# Patient Record
Sex: FEMALE | Race: WHITE | NOT HISPANIC OR LATINO | Employment: OTHER | URBAN - METROPOLITAN AREA
[De-identification: names, ages, dates, MRNs, and addresses within clinical notes are randomized per-mention and may not be internally consistent; named-entity substitution may affect disease eponyms.]

---

## 2023-03-17 ENCOUNTER — TELEPHONE (OUTPATIENT)
Dept: PRIMARY CARE | Facility: CLINIC | Age: 83
End: 2023-03-17

## 2023-03-17 PROCEDURE — G0179 MD RECERTIFICATION HHA PT: HCPCS | Performed by: INTERNAL MEDICINE

## 2023-03-24 DIAGNOSIS — R51.9 NONINTRACTABLE HEADACHE, UNSPECIFIED CHRONICITY PATTERN, UNSPECIFIED HEADACHE TYPE: Primary | ICD-10-CM

## 2023-03-24 RX ORDER — DIVALPROEX SODIUM 250 MG/1
250 TABLET, DELAYED RELEASE ORAL 2 TIMES DAILY
Qty: 60 TABLET | Refills: 0 | Status: SHIPPED | OUTPATIENT
Start: 2023-03-24 | End: 2023-10-30

## 2023-03-24 RX ORDER — DIVALPROEX SODIUM 250 MG/1
250 TABLET, DELAYED RELEASE ORAL 2 TIMES DAILY
COMMUNITY
End: 2023-03-24 | Stop reason: SDUPTHER

## 2023-04-10 ENCOUNTER — OFFICE VISIT (OUTPATIENT)
Dept: PRIMARY CARE | Facility: CLINIC | Age: 83
End: 2023-04-10
Payer: MEDICARE

## 2023-04-10 VITALS
BODY MASS INDEX: 35.25 KG/M2 | WEIGHT: 199 LBS | HEART RATE: 62 BPM | SYSTOLIC BLOOD PRESSURE: 128 MMHG | DIASTOLIC BLOOD PRESSURE: 64 MMHG | TEMPERATURE: 97.3 F | RESPIRATION RATE: 16 BRPM

## 2023-04-10 DIAGNOSIS — E03.9 HYPOTHYROIDISM, UNSPECIFIED TYPE: ICD-10-CM

## 2023-04-10 DIAGNOSIS — G47.00 INSOMNIA, UNSPECIFIED TYPE: ICD-10-CM

## 2023-04-10 DIAGNOSIS — E55.9 VITAMIN D DEFICIENCY: ICD-10-CM

## 2023-04-10 DIAGNOSIS — E66.09 CLASS 2 OBESITY DUE TO EXCESS CALORIES WITHOUT SERIOUS COMORBIDITY WITH BODY MASS INDEX (BMI) OF 35.0 TO 35.9 IN ADULT: ICD-10-CM

## 2023-04-10 DIAGNOSIS — Z00.00 ROUTINE GENERAL MEDICAL EXAMINATION AT HEALTH CARE FACILITY: Primary | ICD-10-CM

## 2023-04-10 DIAGNOSIS — R73.9 HYPERGLYCEMIA, UNSPECIFIED: ICD-10-CM

## 2023-04-10 DIAGNOSIS — R53.83 OTHER FATIGUE: ICD-10-CM

## 2023-04-10 PROCEDURE — 1157F ADVNC CARE PLAN IN RCRD: CPT | Performed by: INTERNAL MEDICINE

## 2023-04-10 PROCEDURE — 1170F FXNL STATUS ASSESSED: CPT | Performed by: INTERNAL MEDICINE

## 2023-04-10 PROCEDURE — 82306 VITAMIN D 25 HYDROXY: CPT | Performed by: INTERNAL MEDICINE

## 2023-04-10 PROCEDURE — G0439 PPPS, SUBSEQ VISIT: HCPCS | Performed by: INTERNAL MEDICINE

## 2023-04-10 PROCEDURE — 84443 ASSAY THYROID STIM HORMONE: CPT | Performed by: INTERNAL MEDICINE

## 2023-04-10 PROCEDURE — 85025 COMPLETE CBC W/AUTO DIFF WBC: CPT | Performed by: INTERNAL MEDICINE

## 2023-04-10 PROCEDURE — 83036 HEMOGLOBIN GLYCOSYLATED A1C: CPT | Performed by: INTERNAL MEDICINE

## 2023-04-10 PROCEDURE — 1036F TOBACCO NON-USER: CPT | Performed by: INTERNAL MEDICINE

## 2023-04-10 PROCEDURE — 1160F RVW MEDS BY RX/DR IN RCRD: CPT | Performed by: INTERNAL MEDICINE

## 2023-04-10 PROCEDURE — 1159F MED LIST DOCD IN RCRD: CPT | Performed by: INTERNAL MEDICINE

## 2023-04-10 PROCEDURE — 99213 OFFICE O/P EST LOW 20 MIN: CPT | Performed by: INTERNAL MEDICINE

## 2023-04-10 RX ORDER — ASPIRIN 81 MG/1
TABLET ORAL
COMMUNITY
End: 2023-05-15

## 2023-04-10 RX ORDER — OLMESARTAN MEDOXOMIL 40 MG/1
TABLET ORAL
COMMUNITY
End: 2023-06-30

## 2023-04-10 RX ORDER — AMMONIUM LACTATE 12 G/100G
CREAM TOPICAL
COMMUNITY
Start: 2022-09-01

## 2023-04-10 RX ORDER — SIMVASTATIN 40 MG/1
40 TABLET, FILM COATED ORAL NIGHTLY
COMMUNITY
End: 2023-11-09

## 2023-04-10 RX ORDER — MIRTAZAPINE 7.5 MG/1
7.5 TABLET, FILM COATED ORAL NIGHTLY
Qty: 30 TABLET | Refills: 2 | Status: SHIPPED | OUTPATIENT
Start: 2023-04-10 | End: 2023-07-03

## 2023-04-10 RX ORDER — SERTRALINE HYDROCHLORIDE 25 MG/1
TABLET, FILM COATED ORAL
COMMUNITY
Start: 2021-07-13 | End: 2023-10-24 | Stop reason: ALTCHOICE

## 2023-04-10 RX ORDER — POLYVINYL ALCOHOL, POVIDONE .6; .5 G/100ML; G/100ML
SOLUTION/ DROPS INTRAOCULAR
COMMUNITY
Start: 2023-03-23 | End: 2024-01-23 | Stop reason: WASHOUT

## 2023-04-10 RX ORDER — OMEPRAZOLE 40 MG/1
CAPSULE, DELAYED RELEASE ORAL
COMMUNITY
Start: 2016-07-20 | End: 2024-04-29 | Stop reason: WASHOUT

## 2023-04-10 RX ORDER — LEVOTHYROXINE SODIUM 50 UG/1
50 TABLET ORAL DAILY
Qty: 30 TABLET | Refills: 2 | Status: SHIPPED | OUTPATIENT
Start: 2023-04-10 | End: 2023-09-08

## 2023-04-10 RX ORDER — MUPIROCIN 20 MG/G
OINTMENT TOPICAL
COMMUNITY
Start: 2022-08-02 | End: 2024-04-29 | Stop reason: WASHOUT

## 2023-04-10 RX ORDER — DICLOFENAC SODIUM 10 MG/G
GEL TOPICAL
COMMUNITY
Start: 2022-11-17

## 2023-04-10 RX ORDER — ATENOLOL 50 MG/1
50 TABLET ORAL DAILY
COMMUNITY
End: 2023-06-19

## 2023-04-10 ASSESSMENT — ACTIVITIES OF DAILY LIVING (ADL)
STIL DRIVING: NO
DOING HOUSEWORK: NEEDS ASSISTANCE
JUDGMENT_ADEQUATE_SAFELY_COMPLETE_DAILY_ACTIVITIES: YES
ADEQUATE_TO_COMPLETE_ADL: YES
TAKING MEDICATION: INDEPENDENT
HEARING - LEFT EAR: FUNCTIONAL
GROCERY SHOPPING: NEEDS ASSISTANCE
FEEDING YOURSELF: INDEPENDENT
FEEDING: INDEPENDENT
ADEQUATE_TO_COMPLETE_ADL: YES
USING TELEPHONE: INDEPENDENT
WALKS IN HOME: INDEPENDENT
BATHING: NEEDS ASSISTANCE
MANAGING FINANCES: TOTAL CARE
USING TRANSPORTATION: TOTAL CARE
GROOMING: INDEPENDENT
DRESSING: INDEPENDENT
NEEDS ASSISTANCE WITH FOOD: SET UP OF PLATE
TOILETING: INDEPENDENT
PATIENT'S MEMORY ADEQUATE TO SAFELY COMPLETE DAILY ACTIVITIES?: YES
JUDGMENT_ADEQUATE_SAFELY_COMPLETE_DAILY_ACTIVITIES: YES
HEARING - RIGHT EAR: HEARING AID
PREPARING MEALS: NEEDS ASSISTANCE
PILL BOX USED: YES
TOILETING: INDEPENDENT
DRESSING YOURSELF: INDEPENDENT
BATHING: NEEDS ASSISTANCE
EATING: INDEPENDENT

## 2023-04-10 ASSESSMENT — ANXIETY QUESTIONNAIRES
4. TROUBLE RELAXING: SEVERAL DAYS
6. BECOMING EASILY ANNOYED OR IRRITABLE: SEVERAL DAYS
5. BEING SO RESTLESS THAT IT IS HARD TO SIT STILL: SEVERAL DAYS
7. FEELING AFRAID AS IF SOMETHING AWFUL MIGHT HAPPEN: SEVERAL DAYS
IF YOU CHECKED OFF ANY PROBLEMS ON THIS QUESTIONNAIRE, HOW DIFFICULT HAVE THESE PROBLEMS MADE IT FOR YOU TO DO YOUR WORK, TAKE CARE OF THINGS AT HOME, OR GET ALONG WITH OTHER PEOPLE: SOMEWHAT DIFFICULT
1. FEELING NERVOUS, ANXIOUS, OR ON EDGE: SEVERAL DAYS
3. WORRYING TOO MUCH ABOUT DIFFERENT THINGS: SEVERAL DAYS
GAD7 TOTAL SCORE: 7
2. NOT BEING ABLE TO STOP OR CONTROL WORRYING: SEVERAL DAYS

## 2023-04-10 ASSESSMENT — ENCOUNTER SYMPTOMS
SLEEP DISTURBANCE: 1
WEAKNESS: 1
OCCASIONAL FEELINGS OF UNSTEADINESS: 1
HEADACHES: 1
PALPITATIONS: 0
CONSTIPATION: 0
ABDOMINAL DISTENTION: 0
ARTHRALGIAS: 1
LIGHT-HEADEDNESS: 1
LOSS OF SENSATION IN FEET: 0
NERVOUS/ANXIOUS: 1
BACK PAIN: 1
RHINORRHEA: 0
DIARRHEA: 0
FREQUENCY: 1
COUGH: 0
FATIGUE: 1
SHORTNESS OF BREATH: 0
NAUSEA: 0
DEPRESSION: 1
ACTIVITY CHANGE: 1

## 2023-04-10 ASSESSMENT — COLUMBIA-SUICIDE SEVERITY RATING SCALE - C-SSRS
6. HAVE YOU EVER DONE ANYTHING, STARTED TO DO ANYTHING, OR PREPARED TO DO ANYTHING TO END YOUR LIFE?: NO
1. IN THE PAST MONTH, HAVE YOU WISHED YOU WERE DEAD OR WISHED YOU COULD GO TO SLEEP AND NOT WAKE UP?: NO
2. HAVE YOU ACTUALLY HAD ANY THOUGHTS OF KILLING YOURSELF?: NO

## 2023-04-10 ASSESSMENT — PATIENT HEALTH QUESTIONNAIRE - PHQ9
1. LITTLE INTEREST OR PLEASURE IN DOING THINGS: SEVERAL DAYS
10. IF YOU CHECKED OFF ANY PROBLEMS, HOW DIFFICULT HAVE THESE PROBLEMS MADE IT FOR YOU TO DO YOUR WORK, TAKE CARE OF THINGS AT HOME, OR GET ALONG WITH OTHER PEOPLE: SOMEWHAT DIFFICULT
2. FEELING DOWN, DEPRESSED OR HOPELESS: SEVERAL DAYS
SUM OF ALL RESPONSES TO PHQ9 QUESTIONS 1 AND 2: 2

## 2023-04-10 ASSESSMENT — PAIN SCALES - GENERAL: PAINLEVEL: 4

## 2023-04-10 NOTE — PROGRESS NOTES
Subjective        Complaining of low energy.  Weak.  Has insomnia.    HPI      This is an 82 years old [place] speaking lady with medical history significant for hypertension, hypothyroidism, depression, anxiety, arthralgia, low back pain, diabetes, diet controlled, s/p forehead left-sided squamosal carcinoma removal.  Patient is presented for evaluation and treatment of the above complaints.  Weak at time.  Grieving the loss of her .       Blood pressure is well controlled.  Has been having arthralgia, low back pain.  Has been having fatigue and malaise.        Taking medications as directed.  Blood pressure is very well controlled.  Has been having bilateral knee pain.   Has insomnia.   Weak.  Has small joint pain.  Has been using nutritional supplements.  Has urinary incontinence.    Review of Systems    Review of Systems   Constitutional:  Positive for activity change and fatigue.   HENT:  Negative for postnasal drip and rhinorrhea.    Eyes:  Positive for visual disturbance.   Respiratory:  Negative for cough and shortness of breath.    Cardiovascular:  Negative for chest pain, palpitations and leg swelling.   Gastrointestinal:  Negative for abdominal distention, constipation, diarrhea and nausea.   Genitourinary:  Positive for frequency.   Musculoskeletal:  Positive for arthralgias and back pain.   Neurological:  Positive for weakness, light-headedness and headaches.   Psychiatric/Behavioral:  Positive for sleep disturbance. The patient is nervous/anxious.        Objective        Vitals:    04/10/23 1423   BP: 128/64   Pulse: 62   Resp: 16   Temp: 36.3 °C (97.3 °F)        Physical Exam  Constitutional:       Appearance: Normal appearance.   HENT:      Head: Normocephalic.      Nose: Nose normal.      Mouth/Throat:      Mouth: Mucous membranes are moist.   Cardiovascular:      Rate and Rhythm: Normal rate and regular rhythm.   Pulmonary:      Effort: Pulmonary effort is normal.      Breath sounds: Normal  breath sounds.   Abdominal:      Palpations: Abdomen is soft.   Musculoskeletal:         General: Normal range of motion.      Cervical back: Normal range of motion.   Skin:     General: Skin is warm.   Neurological:      General: No focal deficit present.      Mental Status: She is alert and oriented to person, place, and time.   Psychiatric:         Mood and Affect: Mood normal.       Diagnoses and all orders for this visit:  Routine general medical examination at health care facility (Primary)  -     1 Year Follow Up In Primary Care - Wellness Exam; Future  Hypothyroidism, unspecified type  -     Synthroid 50 mcg tablet; Take 1 tablet (50 mcg) by mouth once daily.  -     Thyroid Stimulating Hormone  Other fatigue  -     CBC  Hyperglycemia, unspecified  -     Hemoglobin A1C  Vitamin D deficiency  -     Vitamin D 1,25 Dihydroxy  Insomnia, unspecified type  -     mirtazapine (Remeron) 7.5 mg tablet; Take 1 tablet (7.5 mg) by mouth once daily at bedtime.  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  Follow-up with dermatology.  Scheduled for stitches removal in AM.    - Hypertension.  Well-controlled.  Current therapy with amlodipine 5 mg 2 tablets daily, HCTZ 12.5 mg daily, olmesartan 40 mg daily and atenolol.  Diet, exercise.  Avoid salt.    - Gastroesophageal reflux disease.  Eat small portions.  Avoid Caffeine, spicy foods, chocolate, alcohol.  Do not wear tight clothes.  Keep last meal before bed time > 3 hours.  Keep head side of the bed elevated at all time    - Bilateral knee pain.  See orthopedic surgery.  You may benefit to have gel injections.    - Low back pain.  Spinal stenosis.  Bilateral leg pain.  Tylenol 500 mg every 6 hours as needed.  Start on physical therapy.    - Depression, anxiety.  Followed by Dr Cortez.  Taking Mirtazapine daily.     -  Dermatitis.  Improved, continue to use ketoconazole shampoo, Temovate Solution.  Use moisturizing cream, avoid soap.    - Constipations.  Continue with Lactulose.  Fiber.  Plenty of fluids.    - Varicosities.  No evidence of DVT.  Wear LINA hose.    - Diabetes Type 2.  Diet controlled, exercise.    - Pancreatic cyst.  Follow up with GI to have endoscopic ultrasound.  Declined for now.    - Fibromyalgia.  Stable now, avoid stress, exercise.    - Hypothyroidism.  Current therapy.    - Hyperlipidemia.  Exercise, diet, take medications as directed.  Well controlled with Simvastatin.    - Class 1 Obesity with BMI 35.25.  Weight loss, exercise, diet.  Ideal BMI is less, then 25.    - Health maintenance.  Influenza, Pneumovax is up to date.  Medicare wellness annual exam is  today.        Sarina Mclain MD

## 2023-05-16 PROCEDURE — G0179 MD RECERTIFICATION HHA PT: HCPCS | Performed by: INTERNAL MEDICINE

## 2023-07-03 DIAGNOSIS — G47.00 INSOMNIA, UNSPECIFIED TYPE: ICD-10-CM

## 2023-07-03 RX ORDER — MIRTAZAPINE 7.5 MG/1
7.5 TABLET, FILM COATED ORAL NIGHTLY
Qty: 30 TABLET | Refills: 0 | Status: SHIPPED | OUTPATIENT
Start: 2023-07-03 | End: 2023-08-02

## 2023-07-05 DIAGNOSIS — I10 PRIMARY HYPERTENSION: ICD-10-CM

## 2023-07-06 ENCOUNTER — APPOINTMENT (OUTPATIENT)
Dept: PRIMARY CARE | Facility: CLINIC | Age: 83
End: 2023-07-06
Payer: MEDICARE

## 2023-07-06 RX ORDER — OLMESARTAN MEDOXOMIL 40 MG/1
TABLET ORAL
Qty: 30 TABLET | Refills: 0 | Status: SHIPPED | OUTPATIENT
Start: 2023-07-06 | End: 2023-07-25

## 2023-07-18 ENCOUNTER — OFFICE VISIT (OUTPATIENT)
Dept: PRIMARY CARE | Facility: CLINIC | Age: 83
End: 2023-07-18
Payer: MEDICARE

## 2023-07-18 VITALS
SYSTOLIC BLOOD PRESSURE: 128 MMHG | HEIGHT: 63 IN | HEART RATE: 62 BPM | RESPIRATION RATE: 16 BRPM | BODY MASS INDEX: 34.2 KG/M2 | WEIGHT: 193 LBS | DIASTOLIC BLOOD PRESSURE: 78 MMHG | TEMPERATURE: 97.9 F

## 2023-07-18 DIAGNOSIS — R39.15 URGENCY OF URINATION: ICD-10-CM

## 2023-07-18 DIAGNOSIS — E03.9 HYPOTHYROIDISM, UNSPECIFIED TYPE: ICD-10-CM

## 2023-07-18 DIAGNOSIS — R53.83 OTHER FATIGUE: ICD-10-CM

## 2023-07-18 DIAGNOSIS — E78.5 HYPERLIPIDEMIA, UNSPECIFIED HYPERLIPIDEMIA TYPE: ICD-10-CM

## 2023-07-18 DIAGNOSIS — R73.9 HYPERGLYCEMIA, UNSPECIFIED: ICD-10-CM

## 2023-07-18 DIAGNOSIS — M25.561 PAIN IN BOTH KNEES, UNSPECIFIED CHRONICITY: ICD-10-CM

## 2023-07-18 DIAGNOSIS — E55.9 VITAMIN D DEFICIENCY: Primary | ICD-10-CM

## 2023-07-18 DIAGNOSIS — M54.50 LOW BACK PAIN, UNSPECIFIED BACK PAIN LATERALITY, UNSPECIFIED CHRONICITY, UNSPECIFIED WHETHER SCIATICA PRESENT: ICD-10-CM

## 2023-07-18 DIAGNOSIS — M25.562 PAIN IN BOTH KNEES, UNSPECIFIED CHRONICITY: ICD-10-CM

## 2023-07-18 LAB
APPEARANCE UR: CLEAR
BILIRUB UR QL STRIP: NEGATIVE
COLOR UR: YELLOW
GLUCOSE UR STRIP-MCNC: NEGATIVE MG/DL
HGB UR QL STRIP: NEGATIVE
KETONES UR STRIP-MCNC: NEGATIVE MG/DL
LEUKOCYTE ESTERASE UR QL STRIP: NEGATIVE
NITRITE UR QL STRIP: NEGATIVE
PH UR STRIP: 7 [PH]
PROT UR STRIP-MCNC: NORMAL MG/DL
SP GR UR STRIP.AUTO: 1.02
UROBILINOGEN UR STRIP-ACNC: 0.2 E.U./DL

## 2023-07-18 PROCEDURE — 82044 UR ALBUMIN SEMIQUANTITATIVE: CPT | Performed by: INTERNAL MEDICINE

## 2023-07-18 PROCEDURE — 1160F RVW MEDS BY RX/DR IN RCRD: CPT | Performed by: INTERNAL MEDICINE

## 2023-07-18 PROCEDURE — 1159F MED LIST DOCD IN RCRD: CPT | Performed by: INTERNAL MEDICINE

## 2023-07-18 PROCEDURE — 83036 HEMOGLOBIN GLYCOSYLATED A1C: CPT | Performed by: INTERNAL MEDICINE

## 2023-07-18 PROCEDURE — 1157F ADVNC CARE PLAN IN RCRD: CPT | Performed by: INTERNAL MEDICINE

## 2023-07-18 PROCEDURE — 84443 ASSAY THYROID STIM HORMONE: CPT | Performed by: INTERNAL MEDICINE

## 2023-07-18 PROCEDURE — 99213 OFFICE O/P EST LOW 20 MIN: CPT | Performed by: INTERNAL MEDICINE

## 2023-07-18 PROCEDURE — 82306 VITAMIN D 25 HYDROXY: CPT | Performed by: INTERNAL MEDICINE

## 2023-07-18 PROCEDURE — 80061 LIPID PANEL: CPT | Performed by: INTERNAL MEDICINE

## 2023-07-18 PROCEDURE — 80053 COMPREHEN METABOLIC PANEL: CPT | Performed by: INTERNAL MEDICINE

## 2023-07-18 PROCEDURE — 1125F AMNT PAIN NOTED PAIN PRSNT: CPT | Performed by: INTERNAL MEDICINE

## 2023-07-18 PROCEDURE — 81003 URINALYSIS AUTO W/O SCOPE: CPT | Performed by: INTERNAL MEDICINE

## 2023-07-18 PROCEDURE — 1036F TOBACCO NON-USER: CPT | Performed by: INTERNAL MEDICINE

## 2023-07-18 ASSESSMENT — ENCOUNTER SYMPTOMS
UNEXPECTED WEIGHT CHANGE: 0
BACK PAIN: 1
APPETITE CHANGE: 0
LIGHT-HEADEDNESS: 1
ARTHRALGIAS: 1
ACTIVITY CHANGE: 0
SLEEP DISTURBANCE: 1
FATIGUE: 1
HEADACHES: 1
COLOR CHANGE: 1

## 2023-07-18 ASSESSMENT — PAIN SCALES - GENERAL: PAINLEVEL: 6

## 2023-07-18 NOTE — PROGRESS NOTES
Subjective    Massiel Ellison is a 82 y.o. female who presents for  Blood work.  Has arthralgia, knee pain.  Has malaise.  Difficulty to ambulate.    HPI  This is an 82 years old [place]ag[place] speaking lady with medical history significant for hypertension, hypothyroidism, depression, anxiety, arthralgia, low back pain, diabetes, diet controlled, s/p forehead left-sided squamosal carcinoma removal.  Patient is presented for evaluation and treatment of the above complaints.  Weak at time.  Grieving the loss of her .       Blood pressure is well controlled.  Has been having arthralgia, low back pain.  Has been having fatigue and malaise.        Taking medications as directed.  Blood pressure is very well controlled.  Has been having bilateral knee pain.   Has insomnia.   Weak.  Has small joint pain.  Has been using nutritional supplements.  Has urinary incontinence.  Depressed, has poor sleep.    Review of Systems   Constitutional:  Positive for fatigue. Negative for activity change, appetite change and unexpected weight change.   Musculoskeletal:  Positive for arthralgias and back pain.   Skin:  Positive for color change.   Neurological:  Positive for light-headedness and headaches.   Psychiatric/Behavioral:  Positive for sleep disturbance.        Objective        Vitals:    07/18/23 1111   BP: 128/78   Pulse: 62   Resp: 16   Temp: 36.6 °C (97.9 °F)        Physical Exam  HENT:      Head: Normocephalic.      Nose: Nose normal.      Mouth/Throat:      Mouth: Mucous membranes are moist.   Cardiovascular:      Rate and Rhythm: Normal rate.   Pulmonary:      Effort: Pulmonary effort is normal.   Abdominal:      Palpations: Abdomen is soft.   Musculoskeletal:         General: Normal range of motion.   Skin:     General: Skin is warm.   Neurological:      General: No focal deficit present.      Mental Status: She is alert.     Diabetic foot exam.  Good pulses, intact skin.  Mild edema.    Diagnoses and all orders for this  visit:  Vitamin D deficiency (Primary)  -     Vitamin D, Total  Hypothyroidism, unspecified type  Other fatigue  -     CBC  -     Thyroid Stimulating Hormone  Hyperglycemia, unspecified  -     Hemoglobin A1C  -     POCT ALBUMIN RANDOM URINE DOCKED DEVICE  Hyperlipidemia, unspecified hyperlipidemia type  -     Comprehensive Metabolic Panel  -     Lipid Panel  Low back pain, unspecified back pain laterality, unspecified chronicity, unspecified whether sciatica present  -     Referral to Physical Therapy; Future  Pain in both knees, unspecified chronicity  -     Referral to Physical Therapy; Future  Urgency of urination  -     POCT UA (Automated) docked device  Other orders  -     POCT URINALYSIS AUTOMATED      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  Follow-up with dermatology.  Scheduled for stitches removal in AM.     - Hypertension.  Well-controlled.  Current therapy with amlodipine 5 mg 2 tablets daily, HCTZ 12.5 mg daily, olmesartan 40 mg daily and atenolol.  Diet, exercise.  Avoid salt.     - Gastroesophageal reflux disease.  Eat small portions.  Avoid Caffeine, spicy foods, chocolate, alcohol.  Do not wear tight clothes.  Keep last meal before bed time > 3 hours.  Keep head side of the bed elevated at all time     - Bilateral knee pain.  See orthopedic surgery.  You may benefit to have gel injections.     - Low back pain.  Spinal stenosis.  Bilateral leg pain.  Tylenol 500 mg every 6 hours as needed.  Start on physical therapy.     - Depression, anxiety.  Followed by Dr Cortez.  Taking Mirtazapine daily.      - Dermatitis.  Improved, continue to use ketoconazole shampoo, Temovate Solution.  Use moisturizing cream, avoid soap.     - Constipations.  Continue with Lactulose.  Fiber.  Plenty of fluids.     - Varicosities.  No evidence of DVT.  Wear LINA hose.     - Diabetes Type 2.  Diet controlled, exercise.     - Pancreatic cyst.  Follow up with GI to have endoscopic ultrasound.  Declined for  now.     - Fibromyalgia.  Stable now, avoid stress, exercise.     - Hypothyroidism.  Current therapy.  TSH 1.72.     - Hyperlipidemia.  Exercise, diet, take medications as directed.  Well controlled with Simvastatin.  Today cholesterol 156, LDL is 72.     - Class 1 Obesity with BMI 34.19  Weight loss, exercise, diet.  Ideal BMI is less, then 25.     - Health maintenance.  Influenza, Pneumovax is up to date.  Medicare wellness annual exam is  up to date.      Sarina Mclain MD

## 2023-09-13 PROCEDURE — G0179 MD RECERTIFICATION HHA PT: HCPCS | Performed by: INTERNAL MEDICINE

## 2023-09-19 DIAGNOSIS — I10 PRIMARY HYPERTENSION: ICD-10-CM

## 2023-09-19 RX ORDER — OLMESARTAN MEDOXOMIL 40 MG/1
40 TABLET ORAL DAILY
Qty: 30 TABLET | Refills: 0 | Status: SHIPPED | OUTPATIENT
Start: 2023-09-19 | End: 2023-12-28

## 2023-09-25 ENCOUNTER — APPOINTMENT (OUTPATIENT)
Dept: PRIMARY CARE | Facility: CLINIC | Age: 83
End: 2023-09-25
Payer: MEDICARE

## 2023-09-27 ENCOUNTER — APPOINTMENT (OUTPATIENT)
Dept: PRIMARY CARE | Facility: CLINIC | Age: 83
End: 2023-09-27
Payer: MEDICARE

## 2023-09-28 ENCOUNTER — OFFICE VISIT (OUTPATIENT)
Dept: PRIMARY CARE | Facility: CLINIC | Age: 83
End: 2023-09-28
Payer: MEDICARE

## 2023-09-28 VITALS
SYSTOLIC BLOOD PRESSURE: 128 MMHG | RESPIRATION RATE: 16 BRPM | HEIGHT: 63 IN | TEMPERATURE: 97.3 F | BODY MASS INDEX: 34.73 KG/M2 | DIASTOLIC BLOOD PRESSURE: 78 MMHG | HEART RATE: 64 BPM | WEIGHT: 196 LBS

## 2023-09-28 DIAGNOSIS — M25.562 PAIN IN BOTH KNEES, UNSPECIFIED CHRONICITY: ICD-10-CM

## 2023-09-28 DIAGNOSIS — M25.561 PAIN IN BOTH KNEES, UNSPECIFIED CHRONICITY: ICD-10-CM

## 2023-09-28 DIAGNOSIS — M54.50 LOW BACK PAIN, UNSPECIFIED BACK PAIN LATERALITY, UNSPECIFIED CHRONICITY, UNSPECIFIED WHETHER SCIATICA PRESENT: Primary | ICD-10-CM

## 2023-09-28 PROCEDURE — 1159F MED LIST DOCD IN RCRD: CPT | Performed by: INTERNAL MEDICINE

## 2023-09-28 PROCEDURE — 99213 OFFICE O/P EST LOW 20 MIN: CPT | Performed by: INTERNAL MEDICINE

## 2023-09-28 PROCEDURE — 1036F TOBACCO NON-USER: CPT | Performed by: INTERNAL MEDICINE

## 2023-09-28 PROCEDURE — 1125F AMNT PAIN NOTED PAIN PRSNT: CPT | Performed by: INTERNAL MEDICINE

## 2023-09-28 PROCEDURE — 1160F RVW MEDS BY RX/DR IN RCRD: CPT | Performed by: INTERNAL MEDICINE

## 2023-09-28 RX ORDER — CLOBETASOL PROPIONATE 0.5 MG/G
CREAM TOPICAL
COMMUNITY
Start: 2023-06-26

## 2023-09-28 RX ORDER — ACETAMINOPHEN 500 MG
500 TABLET ORAL EVERY 6 HOURS PRN
Qty: 100 TABLET | Refills: 2 | Status: SHIPPED | OUTPATIENT
Start: 2023-09-28 | End: 2024-01-06

## 2023-09-28 RX ORDER — CICLOPIROX OLAMINE 7.7 MG/G
CREAM TOPICAL
COMMUNITY
Start: 2023-09-22

## 2023-09-28 ASSESSMENT — ENCOUNTER SYMPTOMS
ARTHRALGIAS: 1
SHORTNESS OF BREATH: 1
FREQUENCY: 1
ABDOMINAL DISTENTION: 0
LIGHT-HEADEDNESS: 1
BACK PAIN: 1
HEADACHES: 1
APPETITE CHANGE: 1
ACTIVITY CHANGE: 1

## 2023-09-28 ASSESSMENT — PAIN SCALES - GENERAL: PAINLEVEL: 7

## 2023-09-28 NOTE — PROGRESS NOTES
Subjective    Massiel Ellison is a 83 y.o. female who presents for  follow up.  Has bilateral knee pain.  Weak.  Depressed.    HPI  This is an 82 years old [place]ian speaking lady with medical history significant for hypertension, hypothyroidism, depression, anxiety, arthralgia, low back pain, diabetes, diet controlled, s/p forehead left-sided squamosal carcinoma removal.  Patient is presented for evaluation and treatment of the above complaints.  Has been having bilateral knee pain.  Has difficulty to ambulate.  Weak at time.  Grieving the loss of her .       Blood pressure is well controlled.  Has been having arthralgia, low back pain.  Has been having fatigue and malaise.        Taking medications as directed.  Blood pressure is very well controlled.  Has been having bilateral knee pain.   Has insomnia.   Weak.  Has small joint pain.  Has been using nutritional supplements.  Has urinary incontinence.  Depressed, has poor sleep.     Review of Systems   Constitutional:  Positive for activity change and appetite change.   Respiratory:  Positive for shortness of breath.    Gastrointestinal:  Negative for abdominal distention.   Genitourinary:  Positive for frequency.   Musculoskeletal:  Positive for arthralgias and back pain.   Neurological:  Positive for light-headedness and headaches.       Objective        Vitals:    09/28/23 1156   BP: 128/78   Pulse: 64   Resp: 16   Temp: 36.3 °C (97.3 °F)        Physical Exam  Constitutional:       Appearance: Normal appearance.   HENT:      Head: Normocephalic.      Mouth/Throat:      Mouth: Mucous membranes are moist.   Pulmonary:      Breath sounds: Normal breath sounds.   Abdominal:      Palpations: Abdomen is soft.   Musculoskeletal:         General: Normal range of motion.      Cervical back: Normal range of motion.   Skin:     General: Skin is warm.   Neurological:      General: No focal deficit present.      Mental Status: She is alert.       Diagnoses and all orders  for this visit:  Low back pain, unspecified back pain laterality, unspecified chronicity, unspecified whether sciatica present (Primary)  -     acetaminophen (Tylenol Extra Strength) 500 mg tablet; Take 1 tablet (500 mg) by mouth every 6 hours if needed for mild pain (1 - 3).  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   Follow up with dermatology.     - Hypertension.  Well-controlled.  Current therapy with amlodipine 5 mg 2 tablets daily, HCTZ 12.5 mg daily, olmesartan 40 mg daily and atenolol.  Diet, exercise.  Avoid salt.     - Gastroesophageal reflux disease.  Eat small portions.  Avoid Caffeine, spicy foods, chocolate, alcohol.  Do not wear tight clothes.  Keep last meal before bed time > 3 hours.  Keep head side of the bed elevated at all time     - Bilateral knee pain.  See orthopedic surgery.  You may benefit to have gel injections.  Repeat Chest X ray.     - Low back pain.  Spinal stenosis.  Bilateral leg pain.  Tylenol 500 mg every 6 hours as needed.  Start on physical therapy.     - Depression, anxiety.  Followed by Dr Cortez.  Taking Mirtazapine daily.      - Dermatitis.  Improved, continue to use ketoconazole shampoo, Temovate Solution.  Use moisturizing cream, avoid soap.     - Constipations.  Continue with Lactulose.  Fiber.  Plenty of fluids.     - Varicosities.  No evidence of DVT.  Wear LINA hose.     - Diabetes Type 2.  Diet controlled, exercise.     - Pancreatic cyst.  Follow up with GI to have endoscopic ultrasound.  Declined for now.     - Fibromyalgia.  Stable now, avoid stress, exercise.     - Hypothyroidism.  Current therapy.  TSH 1.72.     - Hyperlipidemia.  Exercise, diet, take  medications as directed.  Well controlled with Simvastatin.  Today cholesterol 156, LDL is 72.     - Class 1 Obesity with BMI 34.72  Weight loss, exercise, diet.  Ideal BMI is less, then 25.     - Health maintenance.  Influenza, Pneumovax is up to date.  Medicare wellness annual exam is  up to date.      Sarina Mclain MD

## 2023-10-05 DIAGNOSIS — E03.9 HYPOTHYROIDISM, UNSPECIFIED TYPE: ICD-10-CM

## 2023-10-05 DIAGNOSIS — E78.5 HYPERLIPIDEMIA, UNSPECIFIED HYPERLIPIDEMIA TYPE: ICD-10-CM

## 2023-10-06 RX ORDER — ASPIRIN 81 MG/1
TABLET ORAL
Qty: 30 TABLET | Refills: 6 | Status: SHIPPED | OUTPATIENT
Start: 2023-10-06

## 2023-10-06 RX ORDER — LEVOTHYROXINE SODIUM 50 UG/1
50 TABLET ORAL DAILY
Qty: 30 TABLET | Refills: 6 | Status: SHIPPED | OUTPATIENT
Start: 2023-10-06 | End: 2024-04-11

## 2023-10-17 DIAGNOSIS — I10 PRIMARY HYPERTENSION: ICD-10-CM

## 2023-10-17 RX ORDER — HYDROCHLOROTHIAZIDE 12.5 MG/1
TABLET ORAL
Qty: 30 TABLET | Refills: 3 | Status: SHIPPED | OUTPATIENT
Start: 2023-10-17 | End: 2024-02-12

## 2023-10-24 ENCOUNTER — OFFICE VISIT (OUTPATIENT)
Dept: PRIMARY CARE | Facility: CLINIC | Age: 83
End: 2023-10-24
Payer: MEDICARE

## 2023-10-24 VITALS
BODY MASS INDEX: 34.37 KG/M2 | DIASTOLIC BLOOD PRESSURE: 78 MMHG | WEIGHT: 194 LBS | TEMPERATURE: 97.3 F | HEART RATE: 62 BPM | SYSTOLIC BLOOD PRESSURE: 118 MMHG | RESPIRATION RATE: 16 BRPM

## 2023-10-24 DIAGNOSIS — E55.9 VITAMIN D DEFICIENCY: Primary | ICD-10-CM

## 2023-10-24 DIAGNOSIS — E03.9 HYPOTHYROIDISM, UNSPECIFIED TYPE: ICD-10-CM

## 2023-10-24 DIAGNOSIS — E78.5 HYPERLIPIDEMIA, UNSPECIFIED HYPERLIPIDEMIA TYPE: ICD-10-CM

## 2023-10-24 DIAGNOSIS — R53.83 OTHER FATIGUE: ICD-10-CM

## 2023-10-24 DIAGNOSIS — R73.9 HYPERGLYCEMIA, UNSPECIFIED: ICD-10-CM

## 2023-10-24 DIAGNOSIS — E53.8 VITAMIN B12 DEFICIENCY: ICD-10-CM

## 2023-10-24 PROCEDURE — 1159F MED LIST DOCD IN RCRD: CPT | Performed by: INTERNAL MEDICINE

## 2023-10-24 PROCEDURE — 99213 OFFICE O/P EST LOW 20 MIN: CPT | Performed by: INTERNAL MEDICINE

## 2023-10-24 PROCEDURE — 1160F RVW MEDS BY RX/DR IN RCRD: CPT | Performed by: INTERNAL MEDICINE

## 2023-10-24 PROCEDURE — 80061 LIPID PANEL: CPT | Performed by: INTERNAL MEDICINE

## 2023-10-24 PROCEDURE — 1125F AMNT PAIN NOTED PAIN PRSNT: CPT | Performed by: INTERNAL MEDICINE

## 2023-10-24 PROCEDURE — 84443 ASSAY THYROID STIM HORMONE: CPT | Performed by: INTERNAL MEDICINE

## 2023-10-24 PROCEDURE — 82306 VITAMIN D 25 HYDROXY: CPT | Performed by: INTERNAL MEDICINE

## 2023-10-24 PROCEDURE — 82607 VITAMIN B-12: CPT | Performed by: INTERNAL MEDICINE

## 2023-10-24 PROCEDURE — 1036F TOBACCO NON-USER: CPT | Performed by: INTERNAL MEDICINE

## 2023-10-24 PROCEDURE — 83036 HEMOGLOBIN GLYCOSYLATED A1C: CPT | Performed by: INTERNAL MEDICINE

## 2023-10-24 PROCEDURE — 80053 COMPREHEN METABOLIC PANEL: CPT | Performed by: INTERNAL MEDICINE

## 2023-10-24 PROCEDURE — 85025 COMPLETE CBC W/AUTO DIFF WBC: CPT | Performed by: INTERNAL MEDICINE

## 2023-10-24 RX ORDER — CHLORHEXIDINE GLUCONATE ORAL RINSE 1.2 MG/ML
SOLUTION DENTAL
COMMUNITY
Start: 2023-10-16

## 2023-10-24 RX ORDER — HYDROCORTISONE 25 MG/G
CREAM TOPICAL
COMMUNITY
Start: 2023-10-23 | End: 2024-04-29 | Stop reason: WASHOUT

## 2023-10-24 ASSESSMENT — ENCOUNTER SYMPTOMS
FREQUENCY: 1
DIARRHEA: 0
NAUSEA: 0
CONSTIPATION: 0
ARTHRALGIAS: 1
COUGH: 0
SHORTNESS OF BREATH: 0
BACK PAIN: 1
ABDOMINAL DISTENTION: 0

## 2023-10-24 ASSESSMENT — PAIN SCALES - GENERAL: PAINLEVEL: 6

## 2023-10-24 NOTE — PROGRESS NOTES
Subjective    Massiel Ellison is a 83 y.o. female who presents for  follow up.  Weak.  Has arthralgia.  Patient is here for Blood work.    HPI  This is an 83 years old [place] Islander speaking lady with medical history significant for hypertension, hypothyroidism, depression, anxiety, arthralgia, low back pain, diabetes, diet controlled, s/p forehead left-sided squamosal carcinoma removal.  Patient is presented for evaluation and treatment of the above complaints.  She is here for Blood work.  Has been having bilateral knee pain.  Has difficulty to ambulate.  Weak at time.          Blood pressure is well controlled.  Has been having arthralgia, low back pain.  Has been having fatigue and malaise.    Taking medications as directed.  Blood pressure is very well controlled.  Has been having bilateral knee pain.   Has insomnia.   Weak.  Has small joint pain.  Has been using nutritional supplements.  Has urinary incontinence.  Depressed, has poor sleep.    Review of Systems   Respiratory:  Negative for cough and shortness of breath.    Gastrointestinal:  Negative for abdominal distention, constipation, diarrhea and nausea.   Genitourinary:  Positive for frequency.   Musculoskeletal:  Positive for arthralgias and back pain.       Objective        Vitals:    10/24/23 1033   BP: 118/78   Pulse: 62   Resp: 16   Temp: 36.3 °C (97.3 °F)        Physical Exam  HENT:      Head: Normocephalic.      Nose: Nose normal.      Mouth/Throat:      Mouth: Mucous membranes are moist.   Cardiovascular:      Rate and Rhythm: Normal rate.   Pulmonary:      Breath sounds: Normal breath sounds.   Abdominal:      Palpations: Abdomen is soft.   Musculoskeletal:         General: Normal range of motion.   Skin:     General: Skin is warm.   Neurological:      General: No focal deficit present.      Mental Status: She is alert. Mental status is at baseline.       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   Follow up with dermatology.     - Hypertension.  Well-controlled.  Current therapy with amlodipine 5 mg 2 tablets daily, HCTZ 12.5 mg daily, olmesartan 40 mg daily and atenolol.  Diet, exercise.  Avoid salt.     - Gastroesophageal reflux disease.  Eat small portions.  Avoid Caffeine, spicy foods, chocolate, alcohol.  Do not wear tight clothes.  Keep last meal before bed time > 3 hours.  Keep head side of the bed elevated at all time     - Bilateral knee pain.  See orthopedic surgery.  You may benefit to have gel injections.  Repeat Chest X ray.     - Low back pain.  Spinal stenosis.  Bilateral leg pain.  Tylenol 500 mg every 6 hours as needed.  Start on physical therapy.     - Depression, anxiety.  Followed by Dr Cortez.  Taking Mirtazapine daily.      - Dermatitis.  Improved, continue to use ketoconazole shampoo, Temovate Solution.  Use moisturizing cream, avoid soap.     - Constipations.  Continue with Lactulose.  Fiber.  Plenty of fluids.     - Varicosities.  No evidence of DVT.  Wear LINA hose.     - Diabetes Type 2.  Diet controlled, exercise.     - Pancreatic cyst.  Follow up with GI to have endoscopic ultrasound.  Declined for now.     - Fibromyalgia.  Stable now, avoid stress, exercise.     - Hypothyroidism.  Current therapy.  TSH 1.72.     - Hyperlipidemia.  Exercise, diet, take medications as directed.  Well controlled with Simvastatin.  Today cholesterol 156, LDL is 72.     - Class 1 Obesity with BMI 34.37.  Weight loss, exercise, diet.  Ideal BMI is less, then 25.     - Health maintenance.  Influenza,  Pneumovax is up to date.  Medicare wellness annual exam is  up to date.      Sarina Mclain MD

## 2023-10-27 ENCOUNTER — APPOINTMENT (OUTPATIENT)
Dept: PRIMARY CARE | Facility: CLINIC | Age: 83
End: 2023-10-27
Payer: MEDICARE

## 2023-10-27 DIAGNOSIS — G47.00 INSOMNIA, UNSPECIFIED TYPE: ICD-10-CM

## 2023-10-27 RX ORDER — MIRTAZAPINE 7.5 MG/1
7.5 TABLET, FILM COATED ORAL NIGHTLY
Qty: 90 TABLET | Refills: 2 | Status: SHIPPED | OUTPATIENT
Start: 2023-10-27

## 2023-10-30 DIAGNOSIS — R51.9 NONINTRACTABLE HEADACHE, UNSPECIFIED CHRONICITY PATTERN, UNSPECIFIED HEADACHE TYPE: ICD-10-CM

## 2023-10-30 DIAGNOSIS — I10 ESSENTIAL (PRIMARY) HYPERTENSION: ICD-10-CM

## 2023-10-30 RX ORDER — ATENOLOL 50 MG/1
TABLET ORAL
Qty: 90 TABLET | Refills: 3 | Status: SHIPPED | OUTPATIENT
Start: 2023-10-30

## 2023-10-30 RX ORDER — DIVALPROEX SODIUM 250 MG/1
250 TABLET, DELAYED RELEASE ORAL 2 TIMES DAILY
Qty: 60 TABLET | Refills: 3 | Status: SHIPPED | OUTPATIENT
Start: 2023-10-30 | End: 2024-03-08

## 2023-11-12 PROCEDURE — G0179 MD RECERTIFICATION HHA PT: HCPCS | Performed by: INTERNAL MEDICINE

## 2023-11-17 DIAGNOSIS — G47.00 INSOMNIA, UNSPECIFIED: ICD-10-CM

## 2023-11-17 RX ORDER — MIRTAZAPINE 15 MG/1
15 TABLET, ORALLY DISINTEGRATING ORAL NIGHTLY
Qty: 90 TABLET | Refills: 1 | Status: SHIPPED | OUTPATIENT
Start: 2023-11-17 | End: 2024-04-29 | Stop reason: WASHOUT

## 2023-12-08 DIAGNOSIS — E78.5 HYPERLIPIDEMIA, UNSPECIFIED HYPERLIPIDEMIA TYPE: ICD-10-CM

## 2023-12-09 RX ORDER — SIMVASTATIN 40 MG/1
40 TABLET, FILM COATED ORAL DAILY
Qty: 30 TABLET | Refills: 6 | Status: SHIPPED | OUTPATIENT
Start: 2023-12-09

## 2023-12-28 DIAGNOSIS — I10 PRIMARY HYPERTENSION: ICD-10-CM

## 2023-12-28 RX ORDER — OLMESARTAN MEDOXOMIL 40 MG/1
TABLET ORAL
Qty: 30 TABLET | Refills: 6 | Status: SHIPPED | OUTPATIENT
Start: 2023-12-28

## 2024-01-11 DIAGNOSIS — I10 ESSENTIAL (PRIMARY) HYPERTENSION: ICD-10-CM

## 2024-01-11 RX ORDER — AMLODIPINE BESYLATE 5 MG/1
TABLET ORAL
Qty: 90 TABLET | Refills: 3 | Status: SHIPPED | OUTPATIENT
Start: 2024-01-11

## 2024-01-23 ENCOUNTER — OFFICE VISIT (OUTPATIENT)
Dept: PRIMARY CARE | Facility: CLINIC | Age: 84
End: 2024-01-23
Payer: MEDICARE

## 2024-01-23 VITALS
RESPIRATION RATE: 16 BRPM | WEIGHT: 194 LBS | BODY MASS INDEX: 34.37 KG/M2 | TEMPERATURE: 96.9 F | HEART RATE: 72 BPM | DIASTOLIC BLOOD PRESSURE: 78 MMHG | SYSTOLIC BLOOD PRESSURE: 122 MMHG

## 2024-01-23 DIAGNOSIS — E03.9 HYPOTHYROIDISM, UNSPECIFIED TYPE: Primary | ICD-10-CM

## 2024-01-23 DIAGNOSIS — E53.8 VITAMIN B12 DEFICIENCY: ICD-10-CM

## 2024-01-23 DIAGNOSIS — E53.8 FOLATE DEFICIENCY: ICD-10-CM

## 2024-01-23 DIAGNOSIS — R53.81 MALAISE AND FATIGUE: ICD-10-CM

## 2024-01-23 DIAGNOSIS — E55.9 VITAMIN D DEFICIENCY: ICD-10-CM

## 2024-01-23 DIAGNOSIS — R53.83 MALAISE AND FATIGUE: ICD-10-CM

## 2024-01-23 DIAGNOSIS — R73.9 HYPERGLYCEMIA, UNSPECIFIED: ICD-10-CM

## 2024-01-23 DIAGNOSIS — E78.5 HYPERLIPIDEMIA, UNSPECIFIED HYPERLIPIDEMIA TYPE: ICD-10-CM

## 2024-01-23 DIAGNOSIS — R53.83 OTHER FATIGUE: ICD-10-CM

## 2024-01-23 DIAGNOSIS — R73.9 HYPERGLYCEMIA: ICD-10-CM

## 2024-01-23 PROCEDURE — 84443 ASSAY THYROID STIM HORMONE: CPT | Performed by: INTERNAL MEDICINE

## 2024-01-23 PROCEDURE — 80053 COMPREHEN METABOLIC PANEL: CPT | Performed by: INTERNAL MEDICINE

## 2024-01-23 PROCEDURE — 82746 ASSAY OF FOLIC ACID SERUM: CPT | Performed by: INTERNAL MEDICINE

## 2024-01-23 PROCEDURE — 85025 COMPLETE CBC W/AUTO DIFF WBC: CPT | Performed by: INTERNAL MEDICINE

## 2024-01-23 PROCEDURE — 82306 VITAMIN D 25 HYDROXY: CPT | Performed by: INTERNAL MEDICINE

## 2024-01-23 PROCEDURE — 1159F MED LIST DOCD IN RCRD: CPT | Performed by: INTERNAL MEDICINE

## 2024-01-23 PROCEDURE — 1160F RVW MEDS BY RX/DR IN RCRD: CPT | Performed by: INTERNAL MEDICINE

## 2024-01-23 PROCEDURE — 1125F AMNT PAIN NOTED PAIN PRSNT: CPT | Performed by: INTERNAL MEDICINE

## 2024-01-23 PROCEDURE — 1157F ADVNC CARE PLAN IN RCRD: CPT | Performed by: INTERNAL MEDICINE

## 2024-01-23 PROCEDURE — 80061 LIPID PANEL: CPT | Performed by: INTERNAL MEDICINE

## 2024-01-23 PROCEDURE — 99212 OFFICE O/P EST SF 10 MIN: CPT | Performed by: INTERNAL MEDICINE

## 2024-01-23 PROCEDURE — 83036 HEMOGLOBIN GLYCOSYLATED A1C: CPT | Performed by: INTERNAL MEDICINE

## 2024-01-23 PROCEDURE — 1036F TOBACCO NON-USER: CPT | Performed by: INTERNAL MEDICINE

## 2024-01-23 PROCEDURE — 82607 VITAMIN B-12: CPT | Performed by: INTERNAL MEDICINE

## 2024-01-23 RX ORDER — GLYCERIN, HYPROMELLOSE, POLYETHYLENE GLYCOL 400 2; 2; 10 MG/ML; MG/ML; MG/ML
SOLUTION/ DROPS OPHTHALMIC
COMMUNITY
Start: 2023-10-26 | End: 2024-04-29 | Stop reason: WASHOUT

## 2024-01-23 ASSESSMENT — ENCOUNTER SYMPTOMS
ACTIVITY CHANGE: 1
SLEEP DISTURBANCE: 1
SHORTNESS OF BREATH: 0
CHEST TIGHTNESS: 0
ARTHRALGIAS: 1
BACK PAIN: 1
NERVOUS/ANXIOUS: 1
COUGH: 0
FATIGUE: 1
ABDOMINAL DISTENTION: 0
SINUS PRESSURE: 0

## 2024-01-23 ASSESSMENT — PAIN SCALES - GENERAL: PAINLEVEL: 3

## 2024-01-23 NOTE — PROGRESS NOTES
Subjective    Massiel Ellison is a 83 y.o. female who presents for  follow up.  Has arthralgia.  Patient has malaise.  Weak at time.  Has low back pain.    HPI    This is an 83 years old [place] speaking lady with medical history significant for hypertension, hypothyroidism, depression, anxiety, arthralgia, low back pain, diabetes, diet controlled, s/p forehead left-sided squamosal carcinoma removal.  Patient is presented for evaluation and treatment of the above complaints.  She is here for Blood work.  Has been having bilateral knee pain.  Has difficulty to ambulate.  Weak at time.          Blood pressure is well controlled.  Has been having arthralgia, low back pain.  Has been having fatigue and malaise.     Taking medications as directed.  Blood pressure is very well controlled.  Has been having bilateral knee pain.   Has insomnia.   Weak.  Has small joint pain.  Has been using nutritional supplements.  Has urinary incontinence.  Depressed, has poor sleep.  Review of Systems   Constitutional:  Positive for activity change and fatigue.   HENT:  Negative for sinus pressure.    Respiratory:  Negative for cough, chest tightness and shortness of breath.    Gastrointestinal:  Negative for abdominal distention.   Musculoskeletal:  Positive for arthralgias and back pain.   Skin:  Positive for rash.   Psychiatric/Behavioral:  Positive for sleep disturbance. The patient is nervous/anxious.        Objective        Vitals:    01/23/24 1026   BP: 122/78   Pulse: 72   Resp: 16   Temp: 36.1 °C (96.9 °F)        Physical Exam  HENT:      Head: Normocephalic.      Nose: Nose normal.   Eyes:      Extraocular Movements: Extraocular movements intact.   Cardiovascular:      Rate and Rhythm: Regular rhythm.      Pulses: Normal pulses.      Heart sounds: Normal heart sounds.   Pulmonary:      Breath sounds: Normal breath sounds.   Abdominal:      Palpations: Abdomen is soft.   Musculoskeletal:         General: Normal range of motion.    Skin:     General: Skin is warm.   Neurological:      General: No focal deficit present.      Mental Status: She is alert.   Psychiatric:         Mood and Affect: Mood normal.         Thought Content: Thought content normal.       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   Follow up with dermatology.     - Hypertension.  Well-controlled.  Current therapy with amlodipine 5 mg 2 tablets daily, HCTZ 12.5 mg daily, olmesartan 40 mg daily and atenolol.  Diet, exercise.  Avoid salt.     - Gastroesophageal reflux disease.  Eat small portions.  Avoid Caffeine, spicy foods, chocolate, alcohol.  Do not wear tight clothes.  Keep last meal before bed time > 3 hours.  Keep head side of the bed elevated at all time     - Bilateral knee pain.  See orthopedic surgery.  You may benefit to have gel injections.  Repeat Chest X ray.     - Low back pain.  Spinal stenosis.  Bilateral leg pain.  Tylenol 500 mg every 6 hours as needed.  Start on physical therapy.     - Depression, anxiety.  Followed by Dr Cortez.  Taking Mirtazapine daily.      - Dermatitis.  Improved, continue to use ketoconazole shampoo, Temovate Solution.  Use moisturizing cream, avoid soap.     - Constipations.  Continue with Lactulose.  Fiber.  Plenty of fluids.     - Varicosities.  No evidence of DVT.  Wear LINA hose.     - Diabetes Type 2.  Diet controlled, exercise.     - Pancreatic cyst.  Follow up with GI to have endoscopic ultrasound.  Declined  for now.     - Fibromyalgia.  Stable now, avoid stress, exercise.     - Hypothyroidism.  Current therapy.  TSH 1.72.     - Hyperlipidemia.  Exercise, diet, take medications as directed.  Well controlled with Simvastatin.  Today cholesterol 156, LDL is 72.     - Class 1 Obesity with BMI 34.37.  Weight loss, exercise, diet.  Ideal BMI is less, then 25.     - Health maintenance.  Influenza, Pneumovax is up to date.  Medicare wellness annual exam is  up to date.     Sarina Mclain MD

## 2024-01-25 ENCOUNTER — TELEPHONE (OUTPATIENT)
Dept: PRIMARY CARE | Facility: CLINIC | Age: 84
End: 2024-01-25
Payer: MEDICARE

## 2024-03-11 DIAGNOSIS — I10 PRIMARY HYPERTENSION: ICD-10-CM

## 2024-03-11 PROCEDURE — G0179 MD RECERTIFICATION HHA PT: HCPCS | Performed by: INTERNAL MEDICINE

## 2024-03-11 RX ORDER — HYDROCHLOROTHIAZIDE 12.5 MG/1
TABLET ORAL
Qty: 30 TABLET | Refills: 6 | Status: SHIPPED | OUTPATIENT
Start: 2024-03-11

## 2024-04-09 DIAGNOSIS — R51.9 NONINTRACTABLE HEADACHE, UNSPECIFIED CHRONICITY PATTERN, UNSPECIFIED HEADACHE TYPE: ICD-10-CM

## 2024-04-09 RX ORDER — DIVALPROEX SODIUM 250 MG/1
TABLET, DELAYED RELEASE ORAL
Qty: 60 TABLET | Refills: 6 | Status: SHIPPED | OUTPATIENT
Start: 2024-04-09

## 2024-04-24 RX ORDER — GLYCERIN/PROPYLENE GLYCOL 0.3%-1%
DROPS OPHTHALMIC (EYE)
COMMUNITY
Start: 2024-04-05

## 2024-04-24 RX ORDER — NEOMYCIN SULFATE, POLYMYXIN B SULFATE, AND DEXAMETHASONE 3.5; 10000; 1 MG/G; [USP'U]/G; MG/G
OINTMENT OPHTHALMIC
COMMUNITY
Start: 2021-04-30

## 2024-04-24 RX ORDER — LIDOCAINE 36 MG/1
PATCH TOPICAL
COMMUNITY
Start: 2019-04-08

## 2024-04-24 RX ORDER — BETAMETHASONE DIPROPIONATE 0.5 MG/G
CREAM TOPICAL
COMMUNITY
Start: 2020-03-12

## 2024-04-24 RX ORDER — FLUTICASONE PROPIONATE 50 MCG
SPRAY, SUSPENSION (ML) NASAL
COMMUNITY
Start: 2015-05-29

## 2024-04-24 RX ORDER — ESOMEPRAZOLE MAGNESIUM 40 MG/1
CAPSULE, DELAYED RELEASE ORAL
COMMUNITY
Start: 2013-06-28

## 2024-04-29 ENCOUNTER — OFFICE VISIT (OUTPATIENT)
Dept: PRIMARY CARE | Facility: CLINIC | Age: 84
End: 2024-04-29
Payer: MEDICARE

## 2024-04-29 VITALS
TEMPERATURE: 97.1 F | HEART RATE: 62 BPM | DIASTOLIC BLOOD PRESSURE: 76 MMHG | BODY MASS INDEX: 33.3 KG/M2 | WEIGHT: 188 LBS | SYSTOLIC BLOOD PRESSURE: 108 MMHG | RESPIRATION RATE: 16 BRPM

## 2024-04-29 DIAGNOSIS — Z00.00 ROUTINE GENERAL MEDICAL EXAMINATION AT HEALTH CARE FACILITY: ICD-10-CM

## 2024-04-29 DIAGNOSIS — E53.8 VITAMIN B12 DEFICIENCY: ICD-10-CM

## 2024-04-29 DIAGNOSIS — R39.15 URGENCY OF URINATION: ICD-10-CM

## 2024-04-29 DIAGNOSIS — R73.9 HYPERGLYCEMIA: ICD-10-CM

## 2024-04-29 DIAGNOSIS — R73.9 HYPERGLYCEMIA, UNSPECIFIED: ICD-10-CM

## 2024-04-29 DIAGNOSIS — R53.83 OTHER FATIGUE: Primary | ICD-10-CM

## 2024-04-29 DIAGNOSIS — E78.5 HYPERLIPIDEMIA, UNSPECIFIED HYPERLIPIDEMIA TYPE: ICD-10-CM

## 2024-04-29 LAB
APPEARANCE UR: CLEAR
BILIRUB UR QL STRIP: NEGATIVE
COLOR UR: YELLOW
GLUCOSE UR STRIP-MCNC: NEGATIVE MG/DL
HGB UR QL STRIP: NEGATIVE
KETONES UR STRIP-MCNC: NEGATIVE MG/DL
LEUKOCYTE ESTERASE UR QL STRIP: NEGATIVE
NITRITE UR QL STRIP: NEGATIVE
PH UR STRIP: 7 [PH]
PROT UR STRIP-MCNC: NEGATIVE MG/DL
SP GR UR STRIP.AUTO: 1.01
UROBILINOGEN UR STRIP-ACNC: 0.2 E.U./DL

## 2024-04-29 PROCEDURE — 1159F MED LIST DOCD IN RCRD: CPT | Performed by: INTERNAL MEDICINE

## 2024-04-29 PROCEDURE — 80061 LIPID PANEL: CPT | Performed by: INTERNAL MEDICINE

## 2024-04-29 PROCEDURE — 80048 BASIC METABOLIC PNL TOTAL CA: CPT | Performed by: INTERNAL MEDICINE

## 2024-04-29 PROCEDURE — 1126F AMNT PAIN NOTED NONE PRSNT: CPT | Performed by: INTERNAL MEDICINE

## 2024-04-29 PROCEDURE — 1170F FXNL STATUS ASSESSED: CPT | Performed by: INTERNAL MEDICINE

## 2024-04-29 PROCEDURE — 81003 URINALYSIS AUTO W/O SCOPE: CPT | Performed by: INTERNAL MEDICINE

## 2024-04-29 PROCEDURE — 1036F TOBACCO NON-USER: CPT | Performed by: INTERNAL MEDICINE

## 2024-04-29 PROCEDURE — 85025 COMPLETE CBC W/AUTO DIFF WBC: CPT | Performed by: INTERNAL MEDICINE

## 2024-04-29 PROCEDURE — G0439 PPPS, SUBSEQ VISIT: HCPCS | Performed by: INTERNAL MEDICINE

## 2024-04-29 PROCEDURE — 1157F ADVNC CARE PLAN IN RCRD: CPT | Performed by: INTERNAL MEDICINE

## 2024-04-29 PROCEDURE — 1160F RVW MEDS BY RX/DR IN RCRD: CPT | Performed by: INTERNAL MEDICINE

## 2024-04-29 PROCEDURE — 84443 ASSAY THYROID STIM HORMONE: CPT | Performed by: INTERNAL MEDICINE

## 2024-04-29 PROCEDURE — 84450 TRANSFERASE (AST) (SGOT): CPT | Performed by: INTERNAL MEDICINE

## 2024-04-29 PROCEDURE — 84460 ALANINE AMINO (ALT) (SGPT): CPT | Performed by: INTERNAL MEDICINE

## 2024-04-29 PROCEDURE — 83036 HEMOGLOBIN GLYCOSYLATED A1C: CPT | Performed by: INTERNAL MEDICINE

## 2024-04-29 PROCEDURE — 99213 OFFICE O/P EST LOW 20 MIN: CPT | Performed by: INTERNAL MEDICINE

## 2024-04-29 ASSESSMENT — ACTIVITIES OF DAILY LIVING (ADL)
ASSISTIVE_DEVICE: CANE;WALKER
GROOMING: INDEPENDENT
HEARING - RIGHT EAR: HEARING AID
GROCERY SHOPPING: NEEDS ASSISTANCE
TAKING MEDICATION: INDEPENDENT
PATIENT'S MEMORY ADEQUATE TO SAFELY COMPLETE DAILY ACTIVITIES?: YES
FEEDING YOURSELF: INDEPENDENT
EATING: INDEPENDENT
JUDGMENT_ADEQUATE_SAFELY_COMPLETE_DAILY_ACTIVITIES: YES
STIL DRIVING: NO
PILL BOX USED: YES
WALKS IN HOME: INDEPENDENT
BATHING: NEEDS ASSISTANCE
MANAGING FINANCES: TOTAL CARE
NEEDS ASSISTANCE WITH FOOD: SET UP OF PLATE
TOILETING: INDEPENDENT
HEARING - LEFT EAR: HEARING AID
DRESSING YOURSELF: INDEPENDENT
USING TRANSPORTATION: TOTAL CARE
ADEQUATE_TO_COMPLETE_ADL: YES
PREPARING MEALS: NEEDS ASSISTANCE
USING TELEPHONE: NEEDS ASSISTANCE
DOING HOUSEWORK: NEEDS ASSISTANCE

## 2024-04-29 ASSESSMENT — PATIENT HEALTH QUESTIONNAIRE - PHQ9
1. LITTLE INTEREST OR PLEASURE IN DOING THINGS: SEVERAL DAYS
2. FEELING DOWN, DEPRESSED OR HOPELESS: SEVERAL DAYS
1. LITTLE INTEREST OR PLEASURE IN DOING THINGS: SEVERAL DAYS
10. IF YOU CHECKED OFF ANY PROBLEMS, HOW DIFFICULT HAVE THESE PROBLEMS MADE IT FOR YOU TO DO YOUR WORK, TAKE CARE OF THINGS AT HOME, OR GET ALONG WITH OTHER PEOPLE: SOMEWHAT DIFFICULT
2. FEELING DOWN, DEPRESSED OR HOPELESS: SEVERAL DAYS
SUM OF ALL RESPONSES TO PHQ9 QUESTIONS 1 AND 2: 2
SUM OF ALL RESPONSES TO PHQ9 QUESTIONS 1 AND 2: 2
10. IF YOU CHECKED OFF ANY PROBLEMS, HOW DIFFICULT HAVE THESE PROBLEMS MADE IT FOR YOU TO DO YOUR WORK, TAKE CARE OF THINGS AT HOME, OR GET ALONG WITH OTHER PEOPLE: SOMEWHAT DIFFICULT

## 2024-04-29 ASSESSMENT — ANXIETY QUESTIONNAIRES
IF YOU CHECKED OFF ANY PROBLEMS ON THIS QUESTIONNAIRE, HOW DIFFICULT HAVE THESE PROBLEMS MADE IT FOR YOU TO DO YOUR WORK, TAKE CARE OF THINGS AT HOME, OR GET ALONG WITH OTHER PEOPLE: SOMEWHAT DIFFICULT
6. BECOMING EASILY ANNOYED OR IRRITABLE: SEVERAL DAYS
1. FEELING NERVOUS, ANXIOUS, OR ON EDGE: SEVERAL DAYS
7. FEELING AFRAID AS IF SOMETHING AWFUL MIGHT HAPPEN: SEVERAL DAYS
GAD7 TOTAL SCORE: 7
3. WORRYING TOO MUCH ABOUT DIFFERENT THINGS: SEVERAL DAYS
4. TROUBLE RELAXING: SEVERAL DAYS
5. BEING SO RESTLESS THAT IT IS HARD TO SIT STILL: SEVERAL DAYS
2. NOT BEING ABLE TO STOP OR CONTROL WORRYING: SEVERAL DAYS

## 2024-04-29 ASSESSMENT — ENCOUNTER SYMPTOMS
SHORTNESS OF BREATH: 0
LOSS OF SENSATION IN FEET: 0
FREQUENCY: 1
ARTHRALGIAS: 1
SLEEP DISTURBANCE: 1
SINUS PRESSURE: 0
LIGHT-HEADEDNESS: 0
SORE THROAT: 0
HEADACHES: 1
COUGH: 0
BACK PAIN: 1
OCCASIONAL FEELINGS OF UNSTEADINESS: 1
DEPRESSION: 1

## 2024-04-29 ASSESSMENT — GERIATRIC MINI NUTRITIONAL ASSESSMENT (MNA)
C GENERAL MOBILITY: GOES OUT
B WEIGHT LOSS DURING THE LAST 3 MONTHS: NO WEIGHT LOSS
A HAS FOOD INTAKE DECLINED OVER THE PAST 3 MONTHS DUE TO LOSS OF APPETITE, DIGESTIVE PROBLEMS, CHEWING OR SWALLOWING DIFFICULTIES?: NO DECREASE IN FOOD INTAKE

## 2024-04-29 ASSESSMENT — COLUMBIA-SUICIDE SEVERITY RATING SCALE - C-SSRS
2. HAVE YOU ACTUALLY HAD ANY THOUGHTS OF KILLING YOURSELF?: NO
2. HAVE YOU ACTUALLY HAD ANY THOUGHTS OF KILLING YOURSELF?: NO
1. IN THE PAST MONTH, HAVE YOU WISHED YOU WERE DEAD OR WISHED YOU COULD GO TO SLEEP AND NOT WAKE UP?: NO
6. HAVE YOU EVER DONE ANYTHING, STARTED TO DO ANYTHING, OR PREPARED TO DO ANYTHING TO END YOUR LIFE?: NO
6. HAVE YOU EVER DONE ANYTHING, STARTED TO DO ANYTHING, OR PREPARED TO DO ANYTHING TO END YOUR LIFE?: NO
1. IN THE PAST MONTH, HAVE YOU WISHED YOU WERE DEAD OR WISHED YOU COULD GO TO SLEEP AND NOT WAKE UP?: NO

## 2024-04-29 ASSESSMENT — PAIN SCALES - GENERAL: PAINLEVEL: 0-NO PAIN

## 2024-04-29 NOTE — PROGRESS NOTES
Subjective    Massiel Ellison is a 83 y.o. female who presents for follow up .  Patient is here for Medicare Wellness Annual exam.  Has R thigh  pain.  Weak.     HPI    This is an 83 years old [place] speaking lady with medical history significant for hypertension, hypothyroidism, depression, anxiety, arthralgia, low back pain, diabetes, diet controlled, s/p forehead left-sided squamosal carcinoma removal.  Patient is presented for evaluation and treatment of the above complaints.  Patient is here for Medicare Wellness Annual Exam.  She is here for Blood work.  Has been having bilateral knee pain.  Using cane.  Has difficulty to ambulate.  Weak at time.          Blood pressure is well controlled.  Has been having arthralgia, low back pain.  Has been having fatigue and malaise.     Taking medications as directed.  Blood pressure is very well controlled.  Has been having bilateral knee pain.   Has insomnia.   Weak.  Has small joint pain.  Has been using nutritional supplements.  Has urinary incontinence.  Depressed, has poor sleep.  Review of Systems   HENT:  Negative for sinus pressure and sore throat.    Respiratory:  Negative for cough and shortness of breath.    Genitourinary:  Positive for frequency.   Musculoskeletal:  Positive for arthralgias, back pain and gait problem.   Neurological:  Positive for headaches. Negative for light-headedness.   Psychiatric/Behavioral:  Positive for sleep disturbance.        Objective        Vitals:    04/29/24 1018   BP: 108/76   Pulse: 62   Resp: 16   Temp: 36.2 °C (97.1 °F)        Physical Exam  HENT:      Head: Normocephalic.      Mouth/Throat:      Mouth: Mucous membranes are moist.   Eyes:      Pupils: Pupils are equal, round, and reactive to light.   Cardiovascular:      Rate and Rhythm: Normal rate.   Pulmonary:      Breath sounds: Normal breath sounds.   Abdominal:      Palpations: Abdomen is soft.   Musculoskeletal:         General: Normal range of motion.   Skin:      General: Skin is warm.   Neurological:      Mental Status: She is alert. Mental status is at baseline.   Psychiatric:         Mood and Affect: Mood normal.       Diagnoses and all orders for this visit:  Other fatigue (Primary)  -     CBC  -     Thyroid Stimulating Hormone  Vitamin B12 deficiency  Hyperglycemia  Hyperlipidemia, unspecified hyperlipidemia type  -     Basic Metabolic Panel  -     Aspartate Aminotransferase  -     Alanine Aminotransferase  -     Lipid Panel  Hyperglycemia, unspecified  -     Hemoglobin A1C  Urgency of urination  -     POCT UA (Automated) docked device  Routine general medical examination at health care facility  -     1 Year Follow Up In Primary Care - Wellness Exam; Future  Other orders  -     Follow Up In Primary Care - Established; Future  -     POCT URINALYSIS AUTOMATED       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   Follow up with dermatology.     - Hypertension.  Well-controlled.  Current therapy with amlodipine 5 mg 2 tablets daily, HCTZ 12.5 mg daily, olmesartan 40 mg daily and atenolol.  Diet, exercise.  Avoid salt.     - Gastroesophageal reflux disease.  Eat small portions.  Avoid Caffeine, spicy foods, chocolate, alcohol.  Do not wear tight clothes.  Keep last meal before bed time > 3 hours.  Keep head side of the bed elevated at all time     - Bilateral knee pain.  See orthopedic surgery.  You may benefit to have gel injections.  Repeat Chest X ray.     - Low back pain.  Spinal stenosis.  Bilateral leg pain.  Tylenol 500 mg every 6 hours as needed.  Start on physical therapy.     - Depression, anxiety.  Followed by Dr Cortez.  Taking Mirtazapine daily.      - Dermatitis.  Improved, continue to use ketoconazole shampoo, Temovate Solution.  Use moisturizing cream, avoid soap.     - Constipations.  Continue with Lactulose.  Fiber.  Plenty of fluids.     - Varicosities.  No evidence of DVT.  Wear LINA hose.     - Diabetes Type 2.  Diet controlled,  exercise.     - Pancreatic cyst.  Follow up with GI to have endoscopic ultrasound.  Declined for now.     - Fibromyalgia.  Stable now, avoid stress, exercise.     - Hypothyroidism.  Current therapy.   Repeat TSW today.     - Hyperlipidemia.  Exercise, diet, take medications as directed.  Well controlled with Simvastatin.   Repeat lipid panel.     - Class 1 Obesity with BMI 33.30.  Weight loss, exercise, diet.  Ideal BMI is less, then 25.     - Health maintenance.  Influenza, Pneumovax is up to date.  Medicare wellness annual exam is today.     Sarina Mclain MD

## 2024-05-07 DIAGNOSIS — E03.9 HYPOTHYROIDISM, UNSPECIFIED TYPE: ICD-10-CM

## 2024-05-07 RX ORDER — LEVOTHYROXINE SODIUM 50 UG/1
50 TABLET ORAL DAILY
Qty: 30 TABLET | Refills: 6 | Status: SHIPPED | OUTPATIENT
Start: 2024-05-07

## 2024-05-10 PROCEDURE — G0179 MD RECERTIFICATION HHA PT: HCPCS | Performed by: INTERNAL MEDICINE

## 2024-06-28 ENCOUNTER — TELEPHONE (OUTPATIENT)
Dept: PRIMARY CARE | Facility: CLINIC | Age: 84
End: 2024-06-28
Payer: MEDICARE

## 2024-06-28 DIAGNOSIS — E78.5 HYPERLIPIDEMIA, UNSPECIFIED HYPERLIPIDEMIA TYPE: ICD-10-CM

## 2024-06-28 DIAGNOSIS — I10 PRIMARY HYPERTENSION: ICD-10-CM

## 2024-07-02 RX ORDER — SIMVASTATIN 40 MG/1
40 TABLET, FILM COATED ORAL DAILY
Qty: 30 TABLET | Refills: 0 | Status: SHIPPED | OUTPATIENT
Start: 2024-07-02

## 2024-07-02 RX ORDER — OLMESARTAN MEDOXOMIL 40 MG/1
TABLET ORAL
Qty: 30 TABLET | Refills: 0 | Status: SHIPPED | OUTPATIENT
Start: 2024-07-02

## 2024-07-03 NOTE — TELEPHONE ENCOUNTER
Patient called, that has high blood pressure.  Taking amlodipine 5 mg once a day, olmesartan, HCTZ.  Patient is also taking atenolol.  She was advised, if her blood pressure will be high, she can increase amlodipine to 10 mg daily.  Advised to be seen.

## 2024-07-09 PROCEDURE — G0179 MD RECERTIFICATION HHA PT: HCPCS | Performed by: INTERNAL MEDICINE

## 2024-08-01 ENCOUNTER — APPOINTMENT (OUTPATIENT)
Dept: PRIMARY CARE | Facility: CLINIC | Age: 84
End: 2024-08-01
Payer: MEDICARE

## 2024-08-01 VITALS
HEIGHT: 63 IN | DIASTOLIC BLOOD PRESSURE: 62 MMHG | SYSTOLIC BLOOD PRESSURE: 118 MMHG | HEART RATE: 62 BPM | WEIGHT: 186 LBS | RESPIRATION RATE: 16 BRPM | TEMPERATURE: 97.5 F | BODY MASS INDEX: 32.96 KG/M2

## 2024-08-01 DIAGNOSIS — R53.83 OTHER FATIGUE: ICD-10-CM

## 2024-08-01 DIAGNOSIS — R39.15 URGENCY OF URINATION: ICD-10-CM

## 2024-08-01 DIAGNOSIS — E03.9 HYPOTHYROIDISM, UNSPECIFIED TYPE: ICD-10-CM

## 2024-08-01 DIAGNOSIS — R73.9 HYPERGLYCEMIA, UNSPECIFIED: ICD-10-CM

## 2024-08-01 DIAGNOSIS — E78.5 HYPERLIPIDEMIA, UNSPECIFIED HYPERLIPIDEMIA TYPE: ICD-10-CM

## 2024-08-01 DIAGNOSIS — I10 PRIMARY HYPERTENSION: ICD-10-CM

## 2024-08-01 DIAGNOSIS — E55.9 VITAMIN D DEFICIENCY: ICD-10-CM

## 2024-08-01 DIAGNOSIS — R53.81 MALAISE AND FATIGUE: ICD-10-CM

## 2024-08-01 DIAGNOSIS — E53.8 VITAMIN B12 DEFICIENCY: Primary | ICD-10-CM

## 2024-08-01 DIAGNOSIS — R53.83 MALAISE AND FATIGUE: ICD-10-CM

## 2024-08-01 LAB
APPEARANCE UR: CLEAR
BILIRUB UR QL STRIP: NEGATIVE
COLOR UR: YELLOW
GLUCOSE UR STRIP-MCNC: NEGATIVE MG/DL
HGB UR QL STRIP: NEGATIVE
KETONES UR STRIP-MCNC: NEGATIVE MG/DL
LEUKOCYTE ESTERASE UR QL STRIP: NEGATIVE
NITRITE UR QL STRIP: NEGATIVE
PH UR STRIP: 7 [PH]
PROT UR STRIP-MCNC: NEGATIVE MG/DL
SP GR UR STRIP.AUTO: 1.02
UROBILINOGEN UR STRIP-ACNC: 0.2 E.U./DL

## 2024-08-01 PROCEDURE — 90677 PCV20 VACCINE IM: CPT | Performed by: INTERNAL MEDICINE

## 2024-08-01 PROCEDURE — 1157F ADVNC CARE PLAN IN RCRD: CPT | Performed by: INTERNAL MEDICINE

## 2024-08-01 PROCEDURE — 1036F TOBACCO NON-USER: CPT | Performed by: INTERNAL MEDICINE

## 2024-08-01 PROCEDURE — 1159F MED LIST DOCD IN RCRD: CPT | Performed by: INTERNAL MEDICINE

## 2024-08-01 PROCEDURE — 1160F RVW MEDS BY RX/DR IN RCRD: CPT | Performed by: INTERNAL MEDICINE

## 2024-08-01 PROCEDURE — 81003 URINALYSIS AUTO W/O SCOPE: CPT | Performed by: INTERNAL MEDICINE

## 2024-08-01 PROCEDURE — G0009 ADMIN PNEUMOCOCCAL VACCINE: HCPCS | Performed by: INTERNAL MEDICINE

## 2024-08-01 PROCEDURE — 99213 OFFICE O/P EST LOW 20 MIN: CPT | Performed by: INTERNAL MEDICINE

## 2024-08-01 RX ORDER — OLMESARTAN MEDOXOMIL 40 MG/1
TABLET ORAL
Qty: 30 TABLET | Refills: 6 | Status: SHIPPED | OUTPATIENT
Start: 2024-08-01

## 2024-08-01 RX ORDER — SIMVASTATIN 40 MG/1
40 TABLET, FILM COATED ORAL DAILY
Qty: 30 TABLET | Refills: 6 | Status: SHIPPED | OUTPATIENT
Start: 2024-08-01

## 2024-08-01 ASSESSMENT — ENCOUNTER SYMPTOMS
DIARRHEA: 0
NERVOUS/ANXIOUS: 1
PALPITATIONS: 0
LIGHT-HEADEDNESS: 1
SLEEP DISTURBANCE: 1
BACK PAIN: 1
ARTHRALGIAS: 1
COLOR CHANGE: 1
CONSTIPATION: 0

## 2024-08-01 NOTE — PROGRESS NOTES
"Subjective   Patient ID: Massiel Ellison is a 83 y.o. female who presents for  follow up.  Has malaise, fatigue.  Complaining of arthralgia.  In need for BP monitor.    HPI     This is an 83 years old [place] speaking lady with medical history significant for hypertension, hypothyroidism, depression, anxiety, arthralgia, low back pain, diabetes, diet controlled, s/p forehead left-sided squamosal carcinoma removal.  Patient is presented for evaluation and treatment of the above complaints.   Has  malaise, fatigue.  She is here for Blood work.  Has been having bilateral knee pain.  Using cane.  Has difficulty to ambulate.  Weak at time.          Blood pressure is well controlled.  Has been having arthralgia, low back pain.  Has been having fatigue and malaise.     Taking medications as directed.  Blood pressure is very well controlled.  Has been having bilateral knee pain.   Has insomnia.   Weak.  Has small joint pain.  Has been using nutritional supplements.  Has urinary incontinence.  Depressed, has poor sleep.    Review of Systems   Cardiovascular:  Negative for chest pain, palpitations and leg swelling.   Gastrointestinal:  Negative for constipation and diarrhea.   Musculoskeletal:  Positive for arthralgias, back pain and gait problem.   Skin:  Positive for color change.   Neurological:  Positive for light-headedness.   Psychiatric/Behavioral:  Positive for sleep disturbance. The patient is nervous/anxious.        Objective   /62   Pulse 62   Temp 36.4 °C (97.5 °F) (Temporal)   Resp 16   Ht 1.6 m (5' 3\")   Wt 84.4 kg (186 lb)   BMI 32.95 kg/m²     Physical Exam  HENT:      Head: Normocephalic.   Eyes:      Pupils: Pupils are equal, round, and reactive to light.   Cardiovascular:      Rate and Rhythm: Regular rhythm.      Heart sounds: Normal heart sounds.   Pulmonary:      Breath sounds: Normal breath sounds.   Abdominal:      Palpations: Abdomen is soft.   Musculoskeletal:         General: Tenderness " present. Normal range of motion.   Skin:     General: Skin is warm.   Neurological:      Mental Status: She is alert. Mental status is at baseline.   Psychiatric:         Mood and Affect: Mood normal.         Assessment/Plan   Problem List Items Addressed This Visit    None  Visit Diagnoses         Codes    Vitamin B12 deficiency    -  Primary E53.8    Relevant Orders    Vitamin B12    Vitamin D deficiency     E55.9    Relevant Orders    Vitamin D 25-Hydroxy,Total (for eval of Vitamin D levels)    Malaise and fatigue     R53.81, R53.83    Relevant Orders    CBC    Other fatigue     R53.83    Hyperlipidemia, unspecified hyperlipidemia type     E78.5    Relevant Orders    Comprehensive Metabolic Panel    Lipid Panel    Hyperglycemia, unspecified     R73.9    Relevant Orders    Hemoglobin A1C    Hypothyroidism, unspecified type     E03.9    Relevant Orders    Thyroid Stimulating Hormone    Urgency of urination     R39.15    Relevant Orders    POCT UA (Automated) docked device          - Hypertension.  Well-controlled.  Current therapy with amlodipine 5 mg 2 tablets daily, HCTZ 12.5 mg daily, olmesartan 40 mg daily and atenolol.  Diet, exercise.  Avoid salt.  Patient will benefit to have BP monitor.     - Gastroesophageal reflux disease.  Eat small portions.  Avoid Caffeine, spicy foods, chocolate, alcohol.  Do not wear tight clothes.  Keep last meal before bed time > 3 hours.  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   Follow up with dermatology.     - Bilateral knee pain.  See orthopedic surgery.  You may benefit to have gel injections.  Repeat Chest X ray.     - Low back pain.  Spinal stenosis.  Bilateral leg pain.  Tylenol 500 mg every 6 hours as needed.  Start on physical therapy.     - Depression, anxiety.  Followed by Dr Cortez.  Taking Mirtazapine daily.      - Dermatitis.  Improved, continue to use ketoconazole shampoo, Temovate  Solution.  Use moisturizing cream, avoid soap.     - Constipations.  Continue with Lactulose.  Fiber.  Plenty of fluids.     - Varicosities.  No evidence of DVT.  Wear LINA hose.     - Diabetes Type 2.  Diet controlled, exercise.  Repeat Hb A1C.     - Pancreatic cyst.  Follow up with GI to have endoscopic ultrasound.  Declined for now.     - Fibromyalgia.  Stable now, avoid stress, exercise.     - Hypothyroidism.  Current therapy.   Repeat TSW today.     - Hyperlipidemia.  Exercise, diet, take medications as directed.  Well controlled with Simvastatin.   Repeat lipid panel.     - Class 1 Obesity with BMI 32.95  Weight loss, exercise, diet.  Ideal BMI is less, then 25.     - Health maintenance.  Influenza, Pneumovax is up to date.  Medicare wellness annual exam is up today.

## 2024-08-02 LAB — HEMOGLOBIN A1C/HEMOGLOBIN TOTAL IN BLOOD EXTERNAL: 5.3 %

## 2024-09-07 PROCEDURE — G0179 MD RECERTIFICATION HHA PT: HCPCS | Performed by: INTERNAL MEDICINE

## 2024-10-16 DIAGNOSIS — R51.9 NONINTRACTABLE HEADACHE, UNSPECIFIED CHRONICITY PATTERN, UNSPECIFIED HEADACHE TYPE: ICD-10-CM

## 2024-10-16 DIAGNOSIS — I10 PRIMARY HYPERTENSION: ICD-10-CM

## 2024-10-16 RX ORDER — DIVALPROEX SODIUM 250 MG/1
TABLET, DELAYED RELEASE ORAL
Qty: 60 TABLET | Refills: 3 | Status: SHIPPED | OUTPATIENT
Start: 2024-10-16

## 2024-10-16 RX ORDER — HYDROCHLOROTHIAZIDE 12.5 MG/1
TABLET ORAL
Qty: 30 TABLET | Refills: 3 | Status: SHIPPED | OUTPATIENT
Start: 2024-10-16

## 2024-11-07 ENCOUNTER — APPOINTMENT (OUTPATIENT)
Dept: PRIMARY CARE | Facility: CLINIC | Age: 84
End: 2024-11-07
Payer: MEDICARE

## 2024-11-07 VITALS
DIASTOLIC BLOOD PRESSURE: 78 MMHG | SYSTOLIC BLOOD PRESSURE: 122 MMHG | TEMPERATURE: 97.8 F | BODY MASS INDEX: 33.31 KG/M2 | HEART RATE: 64 BPM | HEIGHT: 63 IN | WEIGHT: 188 LBS | RESPIRATION RATE: 16 BRPM

## 2024-11-07 DIAGNOSIS — E11.69 TYPE 2 DIABETES MELLITUS WITH OTHER SPECIFIED COMPLICATION, WITHOUT LONG-TERM CURRENT USE OF INSULIN: ICD-10-CM

## 2024-11-07 DIAGNOSIS — M54.50 LOW BACK PAIN, UNSPECIFIED BACK PAIN LATERALITY, UNSPECIFIED CHRONICITY, UNSPECIFIED WHETHER SCIATICA PRESENT: ICD-10-CM

## 2024-11-07 DIAGNOSIS — E78.5 HYPERLIPIDEMIA, UNSPECIFIED HYPERLIPIDEMIA TYPE: ICD-10-CM

## 2024-11-07 DIAGNOSIS — R53.81 MALAISE AND FATIGUE: ICD-10-CM

## 2024-11-07 DIAGNOSIS — R53.83 MALAISE AND FATIGUE: ICD-10-CM

## 2024-11-07 DIAGNOSIS — R53.83 OTHER FATIGUE: ICD-10-CM

## 2024-11-07 DIAGNOSIS — R73.9 HYPERGLYCEMIA: ICD-10-CM

## 2024-11-07 DIAGNOSIS — L71.9 ROSACEA: Primary | ICD-10-CM

## 2024-11-07 DIAGNOSIS — R73.9 HYPERGLYCEMIA, UNSPECIFIED: ICD-10-CM

## 2024-11-07 LAB
ALBUMIN SERPL BCP-MCNC: 3.9 G/DL (ref 3.4–5)
ALP SERPL-CCNC: 62 U/L (ref 45–117)
ALT SERPL W P-5'-P-CCNC: 23 U/L (ref 16–63)
ANION GAP SERPL CALC-SCNC: 14 MMOL/L (ref 10–20)
AST SERPL W P-5'-P-CCNC: 18 U/L (ref 15–37)
BASOPHILS # BLD AUTO: 0.03 X10*3/UL (ref 0.1–1.6)
BASOPHILS NFR BLD AUTO: 0.67 % (ref 0–0.3)
BILIRUB SERPL-MCNC: 0.5 MG/DL (ref 0.2–1)
BUN SERPL-MCNC: 13 MG/DL (ref 7–18)
CALCIUM SERPL-MCNC: 9.1 MG/DL (ref 8.5–10.1)
CHLORIDE SERPL-SCNC: 98 MMOL/L (ref 98–107)
CHOLEST SERPL-MCNC: 128 MG/DL (ref 0–199)
CHOLESTEROL/HDL RATIO: 2.5 (ref 4.2–7)
CO2 SERPL-SCNC: 29 MMOL/L (ref 21–32)
CREAT SERPL-MCNC: 0.82 MG/DL (ref 0.6–1.1)
EGFRCR SERPLBLD CKD-EPI 2021: 70 ML/MIN/1.73M*2
EOSINOPHIL # BLD AUTO: 0.04 X10*3/UL (ref 0.04–0.5)
EOSINOPHIL NFR BLD AUTO: 0.84 % (ref 0.7–7)
ERYTHROCYTE [DISTWIDTH] IN BLOOD BY AUTOMATED COUNT: 14 % (ref 11.5–14.5)
GLUCOSE SERPL-MCNC: 96 MG/DL (ref 74–100)
HBA1C MFR BLD: 5.4 %
HCT VFR BLD AUTO: 39.3 % (ref 36.6–46.6)
HDLC SERPL-MCNC: 52 MG/DL (ref 40–59)
HGB BLD-MCNC: 13.88 G/DL (ref 12–15.4)
IS PATIENT FASTING: YES
LDLC SERPL DIRECT ASSAY-MCNC: 61 MG/DL (ref 0–100)
LYMPHOCYTES # BLD AUTO: 0.91 X10*3/UL (ref 0–6)
LYMPHOCYTES NFR BLD AUTO: 20.32 % (ref 20.5–51.1)
MCH RBC QN AUTO: 29 PG (ref 26–32)
MCHC RBC AUTO-ENTMCNC: 35.3 G/DL (ref 31–38)
MCV RBC AUTO: 82.3 FL (ref 80–96)
MONOCYTES # BLD AUTO: 0.52 X10*3/UL (ref 1.6–24.9)
MONOCYTES NFR BLD AUTO: 11.5 % (ref 1.7–9.3)
NEUTROPHILS # BLD AUTO: 3 X10*3/UL (ref 1.4–6.5)
NEUTROPHILS NFR BLD AUTO: 66.67 % (ref 42.2–75.2)
PLATELET # BLD AUTO: 206 X10*3/UL (ref 150–450)
PMV BLD AUTO: 8.76 FL (ref 7.8–11)
POTASSIUM SERPL-SCNC: 3.7 MMOL/L (ref 3.5–5.1)
PROT SERPL-MCNC: 6.8 G/DL (ref 6.4–8.2)
RBC # BLD AUTO: 4.78 X10*6/UL (ref 3.9–5.3)
SODIUM SERPL-SCNC: 137 MMOL/L (ref 136–145)
TRIGL SERPL-MCNC: 98 MG/DL
TSH SERPL-ACNC: 2.07 MIU/L (ref 0.44–3.98)
WBC # BLD AUTO: 4.5 X10*3/UL (ref 4.5–10.5)

## 2024-11-07 PROCEDURE — 1159F MED LIST DOCD IN RCRD: CPT | Performed by: INTERNAL MEDICINE

## 2024-11-07 PROCEDURE — 99213 OFFICE O/P EST LOW 20 MIN: CPT | Performed by: INTERNAL MEDICINE

## 2024-11-07 PROCEDURE — 80053 COMPREHEN METABOLIC PANEL: CPT | Performed by: INTERNAL MEDICINE

## 2024-11-07 PROCEDURE — G2211 COMPLEX E/M VISIT ADD ON: HCPCS | Performed by: INTERNAL MEDICINE

## 2024-11-07 PROCEDURE — 83036 HEMOGLOBIN GLYCOSYLATED A1C: CPT | Performed by: INTERNAL MEDICINE

## 2024-11-07 PROCEDURE — 84443 ASSAY THYROID STIM HORMONE: CPT | Performed by: INTERNAL MEDICINE

## 2024-11-07 PROCEDURE — 1157F ADVNC CARE PLAN IN RCRD: CPT | Performed by: INTERNAL MEDICINE

## 2024-11-07 RX ORDER — METRONIDAZOLE 7.5 MG/G
GEL TOPICAL 2 TIMES DAILY
Qty: 45 G | Refills: 2 | Status: SHIPPED | OUTPATIENT
Start: 2024-11-07 | End: 2025-11-07

## 2024-11-07 ASSESSMENT — ENCOUNTER SYMPTOMS
SLEEP DISTURBANCE: 1
APPETITE CHANGE: 0
NERVOUS/ANXIOUS: 1
ARTHRALGIAS: 1
COLOR CHANGE: 1

## 2024-11-07 NOTE — PROGRESS NOTES
"Subjective   Patient ID: Massiel Ellison is a 84 y.o. female who presents with chief complaint of low back pain.  Has facial erythema.  Complaining of bilateral knee pain.    HPI     This is an 83 years old [place] speaking lady with medical history significant for hypertension, hypothyroidism, depression, anxiety, arthralgia, low back pain, diabetes, diet controlled, s/p forehead left-sided squamosal carcinoma removal.  Patient is presented for evaluation and treatment of the above complaints.  Has rosacea, facial erythema.  Has been having arthralgia, low back pain.   Has  malaise, fatigue.     Has been having bilateral knee pain.  Using cane.  Has difficulty to ambulate.  Weak at time.          Blood pressure is well controlled.  Has been having arthralgia, low back pain.  Has been having fatigue and malaise.     Taking medications as directed.  Blood pressure is very well controlled.  Has been having bilateral knee pain.   Has insomnia.   Weak.  Has small joint pain.  Has been using nutritional supplements.  Has urinary incontinence.  Depressed, has poor sleep.    Review of Systems   Constitutional:  Negative for appetite change.   Musculoskeletal:  Positive for arthralgias.   Skin:  Positive for color change.   Psychiatric/Behavioral:  Positive for sleep disturbance. The patient is nervous/anxious.        Objective   /78   Pulse 64   Temp 36.6 °C (97.8 °F) (Temporal)   Resp 16   Ht 1.6 m (5' 3\")   Wt 85.3 kg (188 lb)   BMI 33.30 kg/m²     Physical Exam  HENT:      Head: Normocephalic.      Nose: Nose normal.   Cardiovascular:      Rate and Rhythm: Regular rhythm.      Heart sounds: Normal heart sounds.   Pulmonary:      Breath sounds: Normal breath sounds.   Abdominal:      Palpations: Abdomen is soft.   Skin:     Findings: Erythema present.   Neurological:      Mental Status: She is alert. Mental status is at baseline.   Psychiatric:         Mood and Affect: Mood normal.         Assessment/Plan "   Problem List Items Addressed This Visit             ICD-10-CM    Type 2 diabetes mellitus with other specified complication, without long-term current use of insulin E11.69     Other Visit Diagnoses         Codes    Rosacea    -  Primary L71.9    Relevant Medications    metroNIDAZOLE (Metrogel) 0.75 % gel    Low back pain, unspecified back pain laterality, unspecified chronicity, unspecified whether sciatica present     M54.50    Relevant Orders    Referral to Physical Therapy    Malaise and fatigue     R53.81, R53.83    Relevant Orders    CBC w/5 Part Differential, [facility] (Completed)    Thyroid Stimulating Hormone (Completed)    Other fatigue     R53.83    Hyperglycemia, unspecified     R73.9    Hyperlipidemia, unspecified hyperlipidemia type     E78.5    Relevant Orders    Comprehensive Metabolic Panel (Completed)    Lipid Panel (Completed)    Hyperglycemia     R73.9    Relevant Orders    Hemoglobin A1C (Completed)             - Hypertension.  Well-controlled.  Current therapy with amlodipine 5 mg 2 tablets daily, HCTZ 12.5 mg daily, olmesartan 40 mg daily and atenolol.  Diet, exercise.  Avoid salt.  Patient will benefit to have BP monitor.     - Gastroesophageal reflux disease.  Eat small portions.  Avoid Caffeine, spicy foods, chocolate, alcohol.  Do not wear tight clothes.  Keep last meal before bed time > 3 hours.  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   Follow up with dermatology.     - Bilateral knee pain.  See orthopedic surgery.  You may benefit to have gel injections.  Repeat Chest X ray.     - Low back pain.  Spinal stenosis.  Bilateral leg pain.  Tylenol 500 mg every 6 hours as needed.  Start on physical therapy.  Special mattress.       - Depression, anxiety.  Followed by Dr Cortez.  Taking Mirtazapine daily.      - Dermatitis.  Improved, continue to use ketoconazole shampoo, Temovate Solution.  Use moisturizing cream,  avoid soap.     - Constipations.  Continue with Lactulose.  Fiber.  Plenty of fluids.     - Varicosities.  No evidence of DVT.  Wear LINA hose.     - Diabetes Type 2.  Diet controlled, exercise.  Repeat Hb A1C.     - Pancreatic cyst.  Follow up with GI to have endoscopic ultrasound.  Declined for now.     - Fibromyalgia.  Stable now, avoid stress, exercise.     - Hypothyroidism.  Current therapy.   Repeat TSW today.     - Hyperlipidemia.  Exercise, diet, take medications as directed.  Well controlled with Simvastatin.   Repeat lipid panel.     - Class 1 Obesity with BMI  33.30  Weight loss, exercise, diet.  Ideal BMI is less, then 25.     - Health maintenance.  Influenza, Pneumovax is up to date.  Medicare wellness annual exam is up today.

## 2024-12-11 DIAGNOSIS — I10 ESSENTIAL (PRIMARY) HYPERTENSION: ICD-10-CM

## 2024-12-11 DIAGNOSIS — E03.9 HYPOTHYROIDISM, UNSPECIFIED TYPE: ICD-10-CM

## 2024-12-11 RX ORDER — ATENOLOL 50 MG/1
TABLET ORAL
Qty: 90 TABLET | Refills: 3 | Status: SHIPPED | OUTPATIENT
Start: 2024-12-11

## 2024-12-11 RX ORDER — AMLODIPINE BESYLATE 5 MG/1
TABLET ORAL
Qty: 90 TABLET | Refills: 3 | Status: SHIPPED | OUTPATIENT
Start: 2024-12-11

## 2024-12-11 RX ORDER — LEVOTHYROXINE SODIUM 50 UG/1
50 TABLET ORAL DAILY
Qty: 30 TABLET | Refills: 3 | Status: SHIPPED | OUTPATIENT
Start: 2024-12-11

## 2025-01-30 DIAGNOSIS — L71.9 ROSACEA: ICD-10-CM

## 2025-01-30 RX ORDER — METRONIDAZOLE 7.5 MG/G
GEL TOPICAL
Qty: 45 G | Refills: 0 | Status: SHIPPED | OUTPATIENT
Start: 2025-01-30

## 2025-02-04 DIAGNOSIS — R51.9 NONINTRACTABLE HEADACHE, UNSPECIFIED CHRONICITY PATTERN, UNSPECIFIED HEADACHE TYPE: ICD-10-CM

## 2025-02-04 DIAGNOSIS — E78.5 HYPERLIPIDEMIA, UNSPECIFIED HYPERLIPIDEMIA TYPE: ICD-10-CM

## 2025-02-04 DIAGNOSIS — I10 PRIMARY HYPERTENSION: ICD-10-CM

## 2025-02-04 RX ORDER — HYDROCHLOROTHIAZIDE 12.5 MG/1
TABLET ORAL
Qty: 30 TABLET | Refills: 0 | Status: SHIPPED | OUTPATIENT
Start: 2025-02-04

## 2025-02-04 RX ORDER — OLMESARTAN MEDOXOMIL 40 MG/1
TABLET ORAL
Qty: 30 TABLET | Refills: 0 | Status: SHIPPED | OUTPATIENT
Start: 2025-02-04

## 2025-02-04 RX ORDER — DIVALPROEX SODIUM 250 MG/1
TABLET, DELAYED RELEASE ORAL
Qty: 60 TABLET | Refills: 0 | Status: SHIPPED | OUTPATIENT
Start: 2025-02-04

## 2025-02-04 RX ORDER — SIMVASTATIN 40 MG/1
40 TABLET, FILM COATED ORAL DAILY
Qty: 30 TABLET | Refills: 0 | Status: SHIPPED | OUTPATIENT
Start: 2025-02-04

## 2025-02-06 ENCOUNTER — APPOINTMENT (OUTPATIENT)
Dept: PRIMARY CARE | Facility: CLINIC | Age: 85
End: 2025-02-06
Payer: MEDICARE

## 2025-02-13 ENCOUNTER — APPOINTMENT (OUTPATIENT)
Dept: PRIMARY CARE | Facility: CLINIC | Age: 85
End: 2025-02-13
Payer: MEDICARE

## 2025-02-13 DIAGNOSIS — R73.9 HYPERGLYCEMIA: ICD-10-CM

## 2025-02-13 DIAGNOSIS — E11.69 TYPE 2 DIABETES MELLITUS WITH OTHER SPECIFIED COMPLICATION, WITHOUT LONG-TERM CURRENT USE OF INSULIN: Primary | ICD-10-CM

## 2025-02-13 DIAGNOSIS — E53.8 VITAMIN B12 DEFICIENCY: ICD-10-CM

## 2025-02-13 DIAGNOSIS — E55.9 VITAMIN D DEFICIENCY: ICD-10-CM

## 2025-02-13 DIAGNOSIS — M25.50 ARTHRALGIA, UNSPECIFIED JOINT: ICD-10-CM

## 2025-02-13 DIAGNOSIS — E78.5 HYPERLIPIDEMIA, UNSPECIFIED HYPERLIPIDEMIA TYPE: ICD-10-CM

## 2025-02-13 DIAGNOSIS — R53.83 OTHER FATIGUE: ICD-10-CM

## 2025-02-13 DIAGNOSIS — R53.81 MALAISE AND FATIGUE: ICD-10-CM

## 2025-02-13 DIAGNOSIS — R53.83 MALAISE AND FATIGUE: ICD-10-CM

## 2025-02-13 DIAGNOSIS — R39.15 URGENCY OF URINATION: ICD-10-CM

## 2025-02-13 LAB
APPEARANCE UR: CLEAR
BILIRUB UR QL STRIP: NEGATIVE
COLOR UR: YELLOW
CREAT UR STRIP-MCNC: 300 MG/DL
GLUCOSE UR STRIP-MCNC: NEGATIVE MG/DL
HGB UR QL STRIP: NEGATIVE
KETONES UR STRIP-MCNC: NEGATIVE MG/DL
LEUKOCYTE ESTERASE UR QL STRIP: NEGATIVE
MICROALBUMIN UR TEST STR-MCNC: 80 MG/L
NITRITE UR QL STRIP: NEGATIVE
PH UR STRIP: 6.5 [PH]
PROT UR STRIP-MCNC: NORMAL MG/DL
PROT/CREAT UR: ABNORMAL UG/MG CREAT
SP GR UR STRIP.AUTO: 1.02
UROBILINOGEN UR STRIP-ACNC: 0.2 E.U./DL

## 2025-02-13 RX ORDER — DICLOFENAC SODIUM 10 MG/G
2 GEL TOPICAL 2 TIMES DAILY PRN
Qty: 100 G | Refills: 3 | Status: SHIPPED | OUTPATIENT
Start: 2025-02-13 | End: 2025-03-15

## 2025-02-13 NOTE — PROGRESS NOTES
"Subjective   Patient ID: Massiel Ellison is a 84 y.o. female who presents for  follow up.  Has arthralgia.  Patient has L eye brow dermatitis.    HPI     This is an 84 years old [place] speaking lady with medical history significant for hypertension, hypothyroidism, depression, anxiety, arthralgia, low back pain, diabetes, diet controlled, s/p forehead left-sided squamosal carcinoma removal.  Patient is presented for evaluation and treatment of the above complaints.  Has been having arthralgia, low back pain.  Followed by dermatology for dermatitis.  Patient is here for blood work.     Has been having bilateral knee pain.  Using cane.  Has difficulty to ambulate.  Weak at time.          Blood pressure is well controlled.  Has been having arthralgia, low back pain.  Has been having fatigue and malaise.     Taking medications as directed.  Blood pressure is very well controlled.  Has been having bilateral knee pain.   Has insomnia.   Weak.  Has small joint pain.  Has been using nutritional supplements.  Has urinary incontinence.  Depressed, has poor sleep.    Review of Systems   Constitutional:  Positive for activity change and fatigue.   Musculoskeletal:  Positive for arthralgias and back pain.   Psychiatric/Behavioral:  Positive for sleep disturbance. The patient is nervous/anxious.        Objective   /62   Pulse 64   Temp 36.5 °C (97.7 °F) (Temporal)   Resp 16   Ht 1.6 m (5' 3\")   Wt 85.7 kg (189 lb)   BMI 33.48 kg/m²     Physical Exam  Constitutional:       Appearance: Normal appearance.   Eyes:      Pupils: Pupils are equal, round, and reactive to light.   Cardiovascular:      Pulses: Normal pulses.      Heart sounds: Normal heart sounds.   Pulmonary:      Effort: Pulmonary effort is normal.   Abdominal:      Palpations: Abdomen is soft.   Musculoskeletal:      Cervical back: Normal range of motion.   Skin:     General: Skin is warm.      Findings: Rash present.   Neurological:      General: No focal " deficit present.      Mental Status: She is alert. Mental status is at baseline.   Psychiatric:         Mood and Affect: Mood normal.         Behavior: Behavior normal.         Assessment/Plan   Problem List Items Addressed This Visit             ICD-10-CM    Type 2 diabetes mellitus with other specified complication, without long-term current use of insulin - Primary E11.69    Relevant Orders    Hemoglobin A1C (Completed)    POCT ALBUMIN RANDOM URINE DOCKED DEVICE     Other Visit Diagnoses         Codes    Other fatigue     R53.83    Malaise and fatigue     R53.81, R53.83    Relevant Orders    CBC w/5 Part Differential, [facility] (Completed)    Thyroid Stimulating Hormone (Completed)    Hyperglycemia     R73.9    Hyperlipidemia, unspecified hyperlipidemia type     E78.5    Relevant Orders    Comprehensive Metabolic Panel (Completed)    Lipid Panel (Completed)    Vitamin B12 deficiency     E53.8    Relevant Orders    Vitamin B12 (Completed)    Vitamin D deficiency     E55.9    Relevant Orders    Vitamin D 25-Hydroxy,Total (for eval of Vitamin D levels) (Completed)    Urgency of urination     R39.15    Relevant Orders    POCT UA (Automated) docked device    Arthralgia, unspecified joint     M25.50    Relevant Medications    diclofenac sodium (Voltaren) 1 % gel              Hypertension.  Well-controlled.  Current therapy with amlodipine 5 mg 2 tablets daily, HCTZ 12.5 mg daily, olmesartan 40 mg daily and atenolol.  Diet, exercise.  Avoid salt.  Patient will benefit to have BP monitor.     - Gastroesophageal reflux disease.  Eat small portions.  Avoid Caffeine, spicy foods, chocolate, alcohol.  Do not wear tight clothes.  Keep last meal before bed time > 3 hours.  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   Follow up with dermatology.     - Bilateral knee pain.  See orthopedic surgery.  You may benefit to have gel injections.  Repeat Chest X ray.      - Low back pain.  Spinal stenosis.  Bilateral leg pain.  Tylenol 500 mg every 6 hours as needed.  Start on physical therapy.  Special mattress.        - Depression, anxiety.  Followed by Dr Cortez.  Taking Mirtazapine daily.      - Dermatitis.  Improved, continue to use ketoconazole shampoo, Temovate Solution.  Use moisturizing cream, avoid soap.     - Constipations.  Continue with Lactulose.  Fiber.  Plenty of fluids.     - Varicosities.  No evidence of DVT.  Wear LINA hose.     - Diabetes Type 2.  Diet controlled, exercise.  Repeat Hb A1C.     - Pancreatic cyst.  Follow up with GI to have endoscopic ultrasound.  Declined for now.     - Fibromyalgia.  Stable now, avoid stress, exercise.     - Hypothyroidism.  Current therapy.   Repeat TSW today.     - Hyperlipidemia.  Exercise, diet, take medications as directed.  Well controlled with Simvastatin.   Repeat lipid panel.     - Class 1 Obesity with BMI  33. 48  Weight loss, exercise, diet.  Ideal BMI is less, then 25.     - Health maintenance.  Influenza, Pneumovax is up to date.  Medicare wellness annual exam is up today.      In need for  Grab bars for her bathroom for safety.

## 2025-02-14 LAB
25(OH)D3 SERPL-MCNC: 48 NG/ML (ref 30–100)
ALBUMIN SERPL BCP-MCNC: 4.6 G/DL (ref 3.4–5)
ALP SERPL-CCNC: 67 U/L (ref 45–117)
ALT SERPL W P-5'-P-CCNC: 25 U/L (ref 16–63)
ANION GAP SERPL CALC-SCNC: 15 MMOL/L (ref 10–20)
AST SERPL W P-5'-P-CCNC: 20 U/L (ref 15–37)
BASOPHILS # BLD AUTO: 0.01 X10*3/UL (ref 0.1–1.6)
BASOPHILS NFR BLD AUTO: 0.2 % (ref 0–0.3)
BILIRUB SERPL-MCNC: 0.5 MG/DL (ref 0.2–1)
BUN SERPL-MCNC: 18 MG/DL (ref 7–18)
CALCIUM SERPL-MCNC: 9.5 MG/DL (ref 8.5–10.1)
CHLORIDE SERPL-SCNC: 99 MMOL/L (ref 98–107)
CHOLEST SERPL-MCNC: 153 MG/DL (ref 0–199)
CHOLESTEROL/HDL RATIO: 2.8 (ref 4.2–7)
CO2 SERPL-SCNC: 29 MMOL/L (ref 21–32)
CREAT SERPL-MCNC: 0.84 MG/DL (ref 0.6–1.1)
EGFRCR SERPLBLD CKD-EPI 2021: 68 ML/MIN/1.73M*2
EOSINOPHIL # BLD AUTO: 0.03 X10*3/UL (ref 0.04–0.5)
EOSINOPHIL NFR BLD AUTO: 0.61 % (ref 0.7–7)
ERYTHROCYTE [DISTWIDTH] IN BLOOD BY AUTOMATED COUNT: 13.9 % (ref 11.5–14.5)
GLUCOSE SERPL-MCNC: 98 MG/DL (ref 74–100)
HBA1C MFR BLD: 5.4 %
HCT VFR BLD AUTO: 40.7 % (ref 36.6–46.6)
HDLC SERPL-MCNC: 55 MG/DL (ref 40–59)
HGB BLD-MCNC: 14.16 G/DL (ref 12–15.4)
IS PATIENT FASTING: YES
LDLC SERPL DIRECT ASSAY-MCNC: 78 MG/DL (ref 0–100)
LYMPHOCYTES # BLD AUTO: 1.09 X10*3/UL (ref 0–6)
LYMPHOCYTES NFR BLD AUTO: 19.96 % (ref 20.5–51.1)
MCH RBC QN AUTO: 28.7 PG (ref 26–32)
MCHC RBC AUTO-ENTMCNC: 34.8 G/DL (ref 31–38)
MCV RBC AUTO: 82.3 FL (ref 80–96)
MONOCYTES # BLD AUTO: 0.3 X10*3/UL (ref 1.6–24.9)
MONOCYTES NFR BLD AUTO: 5.55 % (ref 1.7–9.3)
NEUTROPHILS # BLD AUTO: 4.03 X10*3/UL (ref 1.4–6.5)
NEUTROPHILS NFR BLD AUTO: 73.68 % (ref 42.2–75.2)
PLATELET # BLD AUTO: 223.3 X10*3/UL (ref 150–450)
PMV BLD AUTO: 9.14 FL (ref 7.8–11)
POTASSIUM SERPL-SCNC: 3.9 MMOL/L (ref 3.5–5.1)
PROT SERPL-MCNC: 7.2 G/DL (ref 6.4–8.2)
RBC # BLD AUTO: 4.94 X10*6/UL (ref 3.9–5.3)
SODIUM SERPL-SCNC: 139 MMOL/L (ref 136–145)
TRIGL SERPL-MCNC: 87 MG/DL
TSH SERPL-ACNC: 2.23 MIU/L (ref 0.44–3.98)
VIT B12 SERPL-MCNC: 501 PG/ML (ref 193–986)
WBC # BLD AUTO: 5.47 X10*3/UL (ref 4.5–10.5)

## 2025-02-15 VITALS
SYSTOLIC BLOOD PRESSURE: 122 MMHG | RESPIRATION RATE: 16 BRPM | HEART RATE: 64 BPM | WEIGHT: 189 LBS | TEMPERATURE: 97.7 F | DIASTOLIC BLOOD PRESSURE: 62 MMHG | BODY MASS INDEX: 33.49 KG/M2 | HEIGHT: 63 IN

## 2025-02-15 ASSESSMENT — ENCOUNTER SYMPTOMS
ACTIVITY CHANGE: 1
NERVOUS/ANXIOUS: 1
FATIGUE: 1
SLEEP DISTURBANCE: 1
ARTHRALGIAS: 1
BACK PAIN: 1

## 2025-02-28 DIAGNOSIS — E78.5 HYPERLIPIDEMIA, UNSPECIFIED HYPERLIPIDEMIA TYPE: ICD-10-CM

## 2025-02-28 DIAGNOSIS — I10 PRIMARY HYPERTENSION: ICD-10-CM

## 2025-02-28 DIAGNOSIS — R51.9 NONINTRACTABLE HEADACHE, UNSPECIFIED CHRONICITY PATTERN, UNSPECIFIED HEADACHE TYPE: ICD-10-CM

## 2025-02-28 RX ORDER — HYDROCHLOROTHIAZIDE 12.5 MG/1
TABLET ORAL
Qty: 30 TABLET | Refills: 6 | Status: SHIPPED | OUTPATIENT
Start: 2025-02-28

## 2025-02-28 RX ORDER — OLMESARTAN MEDOXOMIL 40 MG/1
TABLET ORAL
Qty: 30 TABLET | Refills: 6 | Status: SHIPPED | OUTPATIENT
Start: 2025-02-28

## 2025-02-28 RX ORDER — DIVALPROEX SODIUM 250 MG/1
TABLET, DELAYED RELEASE ORAL
Qty: 60 TABLET | Refills: 6 | Status: SHIPPED | OUTPATIENT
Start: 2025-02-28

## 2025-02-28 RX ORDER — SIMVASTATIN 40 MG/1
40 TABLET, FILM COATED ORAL DAILY
Qty: 30 TABLET | Refills: 6 | Status: SHIPPED | OUTPATIENT
Start: 2025-02-28

## 2025-03-27 DIAGNOSIS — H04.123 DRY EYE SYNDROME OF BOTH EYES: Primary | ICD-10-CM

## 2025-03-27 DIAGNOSIS — E03.9 HYPOTHYROIDISM, UNSPECIFIED TYPE: ICD-10-CM

## 2025-03-27 RX ORDER — LEVOTHYROXINE SODIUM 50 UG/1
50 TABLET ORAL DAILY
Qty: 30 TABLET | Refills: 0 | Status: SHIPPED | OUTPATIENT
Start: 2025-03-27

## 2025-03-28 DIAGNOSIS — L71.9 ROSACEA: ICD-10-CM

## 2025-03-28 RX ORDER — METRONIDAZOLE TOPICAL 7.5 MG/G
GEL TOPICAL
Qty: 45 G | Refills: 6 | Status: SHIPPED | OUTPATIENT
Start: 2025-03-28

## 2025-04-07 DIAGNOSIS — M54.50 LOW BACK PAIN, UNSPECIFIED BACK PAIN LATERALITY, UNSPECIFIED CHRONICITY, UNSPECIFIED WHETHER SCIATICA PRESENT: ICD-10-CM

## 2025-04-07 RX ORDER — MUPIROCIN 20 MG/G
OINTMENT TOPICAL
COMMUNITY
Start: 2024-08-05

## 2025-04-07 RX ORDER — KETOCONAZOLE 20 MG/G
CREAM TOPICAL
COMMUNITY
Start: 2025-03-27

## 2025-04-07 RX ORDER — CARBAMIDE PEROXIDE 6.5 %
DROPS OTIC (EAR)
COMMUNITY
Start: 2025-02-18 | End: 2025-04-07 | Stop reason: SDUPTHER

## 2025-04-07 RX ORDER — TRIAMCINOLONE ACETONIDE 1 MG/G
CREAM TOPICAL
COMMUNITY
Start: 2025-03-20

## 2025-04-07 RX ORDER — DICLOFENAC SODIUM 10 MG/G
GEL TOPICAL
COMMUNITY
Start: 2025-04-03

## 2025-04-07 RX ORDER — MIRTAZAPINE 15 MG/1
15 TABLET, FILM COATED ORAL NIGHTLY
COMMUNITY
Start: 2025-03-27

## 2025-04-07 RX ORDER — CARBAMIDE PEROXIDE 6.5 %
1 DROPS OTIC (EAR) AS NEEDED
Qty: 15 ML | Refills: 6 | Status: SHIPPED | OUTPATIENT
Start: 2025-04-07

## 2025-04-07 RX ORDER — AMMONIUM LACTATE 12 G/100G
LOTION TOPICAL
COMMUNITY
Start: 2025-03-27

## 2025-04-07 RX ORDER — LIDOCAINE 40 MG/G
CREAM TOPICAL
COMMUNITY
Start: 2024-11-15

## 2025-04-07 RX ORDER — ACETAMINOPHEN 500 MG
TABLET ORAL
Qty: 100 TABLET | Refills: 3 | Status: SHIPPED | OUTPATIENT
Start: 2025-04-07

## 2025-04-07 RX ORDER — SERTRALINE HYDROCHLORIDE 25 MG/1
1 TABLET, FILM COATED ORAL
COMMUNITY
Start: 2025-03-27

## 2025-05-01 ENCOUNTER — APPOINTMENT (OUTPATIENT)
Dept: PRIMARY CARE | Facility: CLINIC | Age: 85
End: 2025-05-01
Payer: MEDICARE

## 2025-05-05 PROCEDURE — G0179 MD RECERTIFICATION HHA PT: HCPCS | Performed by: INTERNAL MEDICINE

## 2025-05-16 ENCOUNTER — APPOINTMENT (OUTPATIENT)
Dept: PRIMARY CARE | Facility: CLINIC | Age: 85
End: 2025-05-16
Payer: MEDICARE

## 2025-05-16 VITALS
BODY MASS INDEX: 33.66 KG/M2 | SYSTOLIC BLOOD PRESSURE: 122 MMHG | WEIGHT: 190 LBS | DIASTOLIC BLOOD PRESSURE: 62 MMHG | RESPIRATION RATE: 16 BRPM | TEMPERATURE: 97.9 F | HEART RATE: 64 BPM | HEIGHT: 63 IN

## 2025-05-16 DIAGNOSIS — R73.9 HYPERGLYCEMIA, UNSPECIFIED: ICD-10-CM

## 2025-05-16 DIAGNOSIS — E03.9 HYPOTHYROIDISM, UNSPECIFIED TYPE: ICD-10-CM

## 2025-05-16 DIAGNOSIS — M54.50 LOW BACK PAIN, UNSPECIFIED BACK PAIN LATERALITY, UNSPECIFIED CHRONICITY, UNSPECIFIED WHETHER SCIATICA PRESENT: ICD-10-CM

## 2025-05-16 DIAGNOSIS — R53.83 MALAISE AND FATIGUE: ICD-10-CM

## 2025-05-16 DIAGNOSIS — E53.8 VITAMIN B12 DEFICIENCY: ICD-10-CM

## 2025-05-16 DIAGNOSIS — E55.9 VITAMIN D DEFICIENCY: ICD-10-CM

## 2025-05-16 DIAGNOSIS — E78.5 HYPERLIPIDEMIA, UNSPECIFIED HYPERLIPIDEMIA TYPE: ICD-10-CM

## 2025-05-16 DIAGNOSIS — Z00.00 ROUTINE GENERAL MEDICAL EXAMINATION AT HEALTH CARE FACILITY: ICD-10-CM

## 2025-05-16 DIAGNOSIS — R53.81 MALAISE AND FATIGUE: ICD-10-CM

## 2025-05-16 DIAGNOSIS — R73.9 HYPERGLYCEMIA: ICD-10-CM

## 2025-05-16 DIAGNOSIS — E11.69 TYPE 2 DIABETES MELLITUS WITH OTHER SPECIFIED COMPLICATION, WITHOUT LONG-TERM CURRENT USE OF INSULIN: Primary | ICD-10-CM

## 2025-05-16 LAB
ALBUMIN SERPL BCP-MCNC: 4.2 G/DL (ref 3.4–5)
ALP SERPL-CCNC: 92 U/L (ref 45–117)
ALT SERPL W P-5'-P-CCNC: 41 U/L (ref 16–63)
ANION GAP SERPL CALC-SCNC: 11 MMOL/L (ref 10–20)
AST SERPL W P-5'-P-CCNC: 26 U/L (ref 15–37)
BASOPHILS # BLD AUTO: 0.04 X10*3/UL (ref 0.1–1.6)
BASOPHILS NFR BLD AUTO: 0.82 % (ref 0–0.3)
BILIRUB SERPL-MCNC: 0.6 MG/DL (ref 0.2–1)
BUN SERPL-MCNC: 16 MG/DL (ref 7–18)
CALCIUM SERPL-MCNC: 9.4 MG/DL (ref 8.5–10.1)
CHLORIDE SERPL-SCNC: 98 MMOL/L (ref 98–107)
CHOLEST SERPL-MCNC: 169 MG/DL (ref 0–199)
CHOLESTEROL/HDL RATIO: 3.1 (ref 4.2–7)
CO2 SERPL-SCNC: 30 MMOL/L (ref 21–32)
CREAT SERPL-MCNC: 0.75 MG/DL (ref 0.6–1.1)
EGFRCR SERPLBLD CKD-EPI 2021: 78 ML/MIN/1.73M*2
EOSINOPHIL # BLD AUTO: 0.04 X10*3/UL (ref 0.04–0.5)
EOSINOPHIL NFR BLD AUTO: 0.8 % (ref 0.7–7)
ERYTHROCYTE [DISTWIDTH] IN BLOOD BY AUTOMATED COUNT: 14.3 % (ref 11.5–14.5)
GLUCOSE SERPL-MCNC: 100 MG/DL (ref 74–100)
HBA1C MFR BLD: 5.3 %
HCT VFR BLD AUTO: 39.4 % (ref 36.6–46.6)
HDLC SERPL-MCNC: 55 MG/DL (ref 40–59)
HGB BLD-MCNC: 14.34 G/DL (ref 12–15.4)
IS PATIENT FASTING: YES
LDLC SERPL DIRECT ASSAY-MCNC: 86 MG/DL (ref 0–100)
LYMPHOCYTES # BLD AUTO: 1.11 X10*3/UL (ref 0–6)
LYMPHOCYTES NFR BLD AUTO: 20.29 % (ref 20.5–51.1)
MCH RBC QN AUTO: 30 PG (ref 26–32)
MCHC RBC AUTO-ENTMCNC: 36.4 G/DL (ref 31–38)
MCV RBC AUTO: 82.5 FL (ref 80–96)
MONOCYTES # BLD AUTO: 0.54 X10*3/UL (ref 1.6–24.9)
MONOCYTES NFR BLD AUTO: 9.92 % (ref 1.7–9.3)
NEUTROPHILS # BLD AUTO: 3.72 X10*3/UL (ref 1.4–6.5)
NEUTROPHILS NFR BLD AUTO: 68.17 % (ref 42.2–75.2)
PLATELET # BLD AUTO: 237.6 X10*3/UL (ref 150–450)
PMV BLD AUTO: 8.21 FL (ref 7.8–11)
POTASSIUM SERPL-SCNC: 3.8 MMOL/L (ref 3.5–5.1)
PROT SERPL-MCNC: 7.3 G/DL (ref 6.4–8.2)
RBC # BLD AUTO: 4.78 X10*6/UL (ref 3.9–5.3)
SODIUM SERPL-SCNC: 135 MMOL/L (ref 136–145)
TRIGL SERPL-MCNC: 122 MG/DL
TSH SERPL-ACNC: 2.08 MIU/L (ref 0.44–3.98)
WBC # BLD AUTO: 5.45 X10*3/UL (ref 4.5–10.5)

## 2025-05-16 PROCEDURE — 1160F RVW MEDS BY RX/DR IN RCRD: CPT | Performed by: INTERNAL MEDICINE

## 2025-05-16 PROCEDURE — 20610 DRAIN/INJ JOINT/BURSA W/O US: CPT | Performed by: INTERNAL MEDICINE

## 2025-05-16 PROCEDURE — 83036 HEMOGLOBIN GLYCOSYLATED A1C: CPT | Performed by: INTERNAL MEDICINE

## 2025-05-16 PROCEDURE — 1170F FXNL STATUS ASSESSED: CPT | Performed by: INTERNAL MEDICINE

## 2025-05-16 PROCEDURE — G0439 PPPS, SUBSEQ VISIT: HCPCS | Performed by: INTERNAL MEDICINE

## 2025-05-16 PROCEDURE — 84443 ASSAY THYROID STIM HORMONE: CPT | Performed by: INTERNAL MEDICINE

## 2025-05-16 PROCEDURE — 80053 COMPREHEN METABOLIC PANEL: CPT | Performed by: INTERNAL MEDICINE

## 2025-05-16 PROCEDURE — 1159F MED LIST DOCD IN RCRD: CPT | Performed by: INTERNAL MEDICINE

## 2025-05-16 PROCEDURE — 99213 OFFICE O/P EST LOW 20 MIN: CPT | Performed by: INTERNAL MEDICINE

## 2025-05-16 RX ORDER — TRIAMCINOLONE ACETONIDE 40 MG/ML
40 INJECTION, SUSPENSION INTRA-ARTICULAR; INTRAMUSCULAR ONCE
Status: COMPLETED | OUTPATIENT
Start: 2025-05-16 | End: 2025-05-16

## 2025-05-16 RX ADMIN — TRIAMCINOLONE ACETONIDE 40 MG: 40 INJECTION, SUSPENSION INTRA-ARTICULAR; INTRAMUSCULAR at 10:25

## 2025-05-16 ASSESSMENT — ANXIETY QUESTIONNAIRES
7. FEELING AFRAID AS IF SOMETHING AWFUL MIGHT HAPPEN: SEVERAL DAYS
4. TROUBLE RELAXING: SEVERAL DAYS
1. FEELING NERVOUS, ANXIOUS, OR ON EDGE: SEVERAL DAYS
5. BEING SO RESTLESS THAT IT IS HARD TO SIT STILL: SEVERAL DAYS
IF YOU CHECKED OFF ANY PROBLEMS ON THIS QUESTIONNAIRE, HOW DIFFICULT HAVE THESE PROBLEMS MADE IT FOR YOU TO DO YOUR WORK, TAKE CARE OF THINGS AT HOME, OR GET ALONG WITH OTHER PEOPLE: SOMEWHAT DIFFICULT
6. BECOMING EASILY ANNOYED OR IRRITABLE: SEVERAL DAYS
2. NOT BEING ABLE TO STOP OR CONTROL WORRYING: SEVERAL DAYS
GAD7 TOTAL SCORE: 7
3. WORRYING TOO MUCH ABOUT DIFFERENT THINGS: SEVERAL DAYS

## 2025-05-16 ASSESSMENT — PATIENT HEALTH QUESTIONNAIRE - PHQ9
SUM OF ALL RESPONSES TO PHQ9 QUESTIONS 1 AND 2: 2
10. IF YOU CHECKED OFF ANY PROBLEMS, HOW DIFFICULT HAVE THESE PROBLEMS MADE IT FOR YOU TO DO YOUR WORK, TAKE CARE OF THINGS AT HOME, OR GET ALONG WITH OTHER PEOPLE: SOMEWHAT DIFFICULT
2. FEELING DOWN, DEPRESSED OR HOPELESS: SEVERAL DAYS
1. LITTLE INTEREST OR PLEASURE IN DOING THINGS: SEVERAL DAYS

## 2025-05-16 ASSESSMENT — ACTIVITIES OF DAILY LIVING (ADL)
NEEDS ASSISTANCE WITH FOOD: INDEPENDENT
DRESSING YOURSELF: INDEPENDENT
WALKS IN HOME: INDEPENDENT
TOILETING: INDEPENDENT
USING TELEPHONE: NEEDS ASSISTANCE
ASSISTIVE_DEVICE: CANE;WALKER
HEARING - RIGHT EAR: HEARING AID
STIL DRIVING: NO
PATIENT'S MEMORY ADEQUATE TO SAFELY COMPLETE DAILY ACTIVITIES?: YES
FEEDING YOURSELF: INDEPENDENT
DOING HOUSEWORK: NEEDS ASSISTANCE
ADEQUATE_TO_COMPLETE_ADL: YES
BATHING: NEEDS ASSISTANCE
PILL BOX USED: YES
EATING: INDEPENDENT
TAKING MEDICATION: NEEDS ASSISTANCE
HEARING - LEFT EAR: HEARING AID
GROCERY SHOPPING: NEEDS ASSISTANCE
JUDGMENT_ADEQUATE_SAFELY_COMPLETE_DAILY_ACTIVITIES: YES
PREPARING MEALS: NEEDS ASSISTANCE
USING TRANSPORTATION: TOTAL CARE
MANAGING FINANCES: TOTAL CARE
GROOMING: INDEPENDENT

## 2025-05-16 ASSESSMENT — GERIATRIC MINI NUTRITIONAL ASSESSMENT (MNA)
B WEIGHT LOSS DURING THE LAST 3 MONTHS: NO WEIGHT LOSS
C GENERAL MOBILITY: GOES OUT

## 2025-05-16 ASSESSMENT — ENCOUNTER SYMPTOMS
OCCASIONAL FEELINGS OF UNSTEADINESS: 1
WEAKNESS: 1
NERVOUS/ANXIOUS: 1
DEPRESSION: 1
FATIGUE: 1
ACTIVITY CHANGE: 1
SLEEP DISTURBANCE: 1
ARTHRALGIAS: 1
BACK PAIN: 1
LOSS OF SENSATION IN FEET: 1

## 2025-05-16 ASSESSMENT — COLUMBIA-SUICIDE SEVERITY RATING SCALE - C-SSRS
2. HAVE YOU ACTUALLY HAD ANY THOUGHTS OF KILLING YOURSELF?: NO
6. HAVE YOU EVER DONE ANYTHING, STARTED TO DO ANYTHING, OR PREPARED TO DO ANYTHING TO END YOUR LIFE?: NO
1. IN THE PAST MONTH, HAVE YOU WISHED YOU WERE DEAD OR WISHED YOU COULD GO TO SLEEP AND NOT WAKE UP?: NO

## 2025-05-16 NOTE — PROGRESS NOTES
"Subjective   Patient ID: Massiel Ellison is a 84 y.o. female who presents for follow up.  In need for Grab bars.  Patient is here for Medicare wellness Annual exam.  Having anxiety episodes.  Has L sided sciatica.    HPI     This is an 84 years old [place] speaking lady with medical history significant for hypertension, hypothyroidism, depression, anxiety, arthralgia, low back pain, diabetes, diet controlled, s/p forehead left-sided squamosal carcinoma removal.  Patient is presented for evaluation and treatment of the above complaints.  Has been having arthralgia, low back pain, has L sided sciatica.  Patient is here for Medicare wellness Annual exam.     Patient is here for blood work.     Has been having bilateral knee pain.  Using cane.  Has difficulty to ambulate.  Weak at time.          Blood pressure is well controlled.  Has been having arthralgia, low back pain.  Has been having fatigue and malaise.     Taking medications as directed.  Blood pressure is very well controlled.  Has been having bilateral knee pain.   Has insomnia.   Weak.  Has small joint pain.  Has been using nutritional supplements.  Has urinary incontinence.  Depressed, has poor sleep.    Review of Systems   Constitutional:  Positive for activity change and fatigue.   Musculoskeletal:  Positive for arthralgias, back pain and gait problem.   Neurological:  Positive for weakness.   Psychiatric/Behavioral:  Positive for sleep disturbance. The patient is nervous/anxious.        Objective   /62   Pulse 64   Temp 36.6 °C (97.9 °F) (Temporal)   Resp 16   Ht 1.6 m (5' 3\")   Wt 86.2 kg (190 lb)   BMI 33.66 kg/m²     Physical Exam  HENT:      Head: Normocephalic.      Mouth/Throat:      Mouth: Mucous membranes are moist.   Cardiovascular:      Rate and Rhythm: Normal rate and regular rhythm.      Heart sounds: Normal heart sounds.   Pulmonary:      Breath sounds: Normal breath sounds.   Abdominal:      Palpations: Abdomen is soft. "   Musculoskeletal:         General: Tenderness present.   Skin:     General: Skin is warm.   Neurological:      Mental Status: She is alert. Mental status is at baseline.   Psychiatric:         Mood and Affect: Mood normal.         Assessment/Plan   Problem List Items Addressed This Visit           ICD-10-CM    Type 2 diabetes mellitus with other specified complication, without long-term current use of insulin - Primary E11.69     Other Visit Diagnoses         Codes      Routine general medical examination at health care facility     Z00.00    Relevant Orders    1 Year Follow Up In Primary Care - Wellness Exam      Malaise and fatigue     R53.81, R53.83    Relevant Orders    CBC w/5 Part Differential, [facility] (Completed)      Hyperglycemia     R73.9      Vitamin B12 deficiency     E53.8      Vitamin D deficiency     E55.9      Hypothyroidism, unspecified type     E03.9    Relevant Orders    Thyroid Stimulating Hormone (Completed)      Hyperglycemia, unspecified     R73.9    Relevant Orders    Hemoglobin A1C (Completed)      Hyperlipidemia, unspecified hyperlipidemia type     E78.5    Relevant Orders    Comprehensive Metabolic Panel (Completed)    Lipid Panel (Completed)      Low back pain, unspecified back pain laterality, unspecified chronicity, unspecified whether sciatica present     M54.50    Relevant Medications    triamcinolone acetonide (Kenalog-40) injection 40 mg (Completed)          L sided sciatica.  Kenalog 40 1 ml and 1 ml of Lidocaine  has been administered   IA       Hypertension.  Well-controlled.  Current therapy with amlodipine 5 mg 2 tablets daily, HCTZ 12.5 mg daily, olmesartan 40 mg daily and atenolol.  Diet, exercise.  Avoid salt.  Patient will benefit to have BP monitor.     - Gastroesophageal reflux disease.  Eat small portions.  Avoid Caffeine, spicy foods, chocolate, alcohol.  Do not wear tight clothes.  Keep last meal before bed time > 3 hours.  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   Follow up with dermatology.     - Bilateral knee pain.  See orthopedic surgery.  You may benefit to have gel injections.  Repeat Chest X ray.     - Low back pain.  Spinal stenosis.  Bilateral leg pain.  Tylenol 500 mg every 6 hours as needed.  Start on physical therapy.  Special mattress.        - Depression, anxiety.  Followed by Dr Cortez.  Taking Mirtazapine daily.      - Dermatitis.  Improved, continue to use ketoconazole shampoo, Temovate Solution.  Use moisturizing cream, avoid soap.     - Constipations.  Continue with Lactulose.  Fiber.  Plenty of fluids.     - Varicosities.  No evidence of DVT.  Wear LINA hose.     - Diabetes Type 2.  Diet controlled, exercise.  Repeat Hb A1C.     - Pancreatic cyst.  Follow up with GI to have endoscopic ultrasound.  Declined for now.     - Fibromyalgia.  Stable now, avoid stress, exercise.     - Hypothyroidism.  Current therapy.   Repeat TSW today.     - Hyperlipidemia.  Exercise, diet, take medications as directed.  Well controlled with Simvastatin.   Repeat lipid panel.     - Class 1 Obesity with BMI  33. 66  Weight loss, exercise, diet.  Ideal BMI is less, then 25.     - Health maintenance.  Influenza, Pneumovax is up to date.  Medicare wellness annual exam  today.      In need for  Grab bars for her bathroom for safety.

## 2025-05-16 NOTE — LETTER
May 16, 2025     Patient: Massiel Ellison   YOB: 1940   Date of Visit: 5/16/2025       To Whom It May Concern:    Massiel Ellison was seen in my clinic on 5/16/2025 at 9:45 am.   Patient is in need for Grab Bars for safety.    If you have any questions or concerns, please don't hesitate to call.         Sincerely,         Sarina Mclain MD

## 2025-07-04 PROCEDURE — G0179 MD RECERTIFICATION HHA PT: HCPCS | Performed by: INTERNAL MEDICINE

## 2025-07-30 DIAGNOSIS — M54.50 LOW BACK PAIN, UNSPECIFIED BACK PAIN LATERALITY, UNSPECIFIED CHRONICITY, UNSPECIFIED WHETHER SCIATICA PRESENT: Primary | ICD-10-CM

## 2025-08-21 ENCOUNTER — APPOINTMENT (OUTPATIENT)
Dept: PRIMARY CARE | Facility: CLINIC | Age: 85
End: 2025-08-21
Payer: MEDICARE

## 2025-08-21 VITALS
SYSTOLIC BLOOD PRESSURE: 132 MMHG | HEIGHT: 63 IN | HEART RATE: 62 BPM | BODY MASS INDEX: 34.2 KG/M2 | RESPIRATION RATE: 16 BRPM | DIASTOLIC BLOOD PRESSURE: 76 MMHG | WEIGHT: 193 LBS | TEMPERATURE: 97.3 F

## 2025-08-21 DIAGNOSIS — F32.A ANXIETY AND DEPRESSION: ICD-10-CM

## 2025-08-21 DIAGNOSIS — E11.69 TYPE 2 DIABETES MELLITUS WITH OTHER SPECIFIED COMPLICATION, WITHOUT LONG-TERM CURRENT USE OF INSULIN: Primary | ICD-10-CM

## 2025-08-21 DIAGNOSIS — F41.9 ANXIETY AND DEPRESSION: ICD-10-CM

## 2025-08-21 DIAGNOSIS — M54.50 LOW BACK PAIN, UNSPECIFIED BACK PAIN LATERALITY, UNSPECIFIED CHRONICITY, UNSPECIFIED WHETHER SCIATICA PRESENT: ICD-10-CM

## 2025-08-21 DIAGNOSIS — E53.8 VITAMIN B12 DEFICIENCY: ICD-10-CM

## 2025-08-21 DIAGNOSIS — R73.9 HYPERGLYCEMIA: ICD-10-CM

## 2025-08-21 DIAGNOSIS — R53.83 MALAISE AND FATIGUE: ICD-10-CM

## 2025-08-21 DIAGNOSIS — E55.9 VITAMIN D DEFICIENCY: ICD-10-CM

## 2025-08-21 DIAGNOSIS — E78.5 HYPERLIPIDEMIA, UNSPECIFIED HYPERLIPIDEMIA TYPE: ICD-10-CM

## 2025-08-21 DIAGNOSIS — R53.81 MALAISE AND FATIGUE: ICD-10-CM

## 2025-08-21 LAB
ALT SERPL W P-5'-P-CCNC: 35 U/L (ref 16–63)
ANION GAP SERPL CALC-SCNC: 15 MMOL/L (ref 10–20)
AST SERPL W P-5'-P-CCNC: 22 U/L (ref 15–37)
BASOPHILS # BLD AUTO: 0.02 X10*3/UL (ref 0.1–1.6)
BASOPHILS NFR BLD AUTO: 0.36 % (ref 0–0.3)
BUN SERPL-MCNC: 14 MG/DL (ref 7–18)
CALCIUM SERPL-MCNC: 9.3 MG/DL (ref 8.5–10.1)
CHLORIDE SERPL-SCNC: 95 MMOL/L (ref 98–107)
CHOLEST SERPL-MCNC: 170 MG/DL (ref 0–199)
CHOLESTEROL/HDL RATIO: 2.8 (ref 4.2–7)
CO2 SERPL-SCNC: 30 MMOL/L (ref 21–32)
CREAT SERPL-MCNC: 0.8 MG/DL (ref 0.6–1.1)
EGFRCR SERPLBLD CKD-EPI 2021: 72 ML/MIN/1.73M*2
EOSINOPHIL # BLD AUTO: 0.02 X10*3/UL (ref 0.04–0.5)
EOSINOPHIL NFR BLD AUTO: 0.38 % (ref 0.7–7)
ERYTHROCYTE [DISTWIDTH] IN BLOOD BY AUTOMATED COUNT: 14 % (ref 11.5–14.5)
GLUCOSE SERPL-MCNC: 96 MG/DL (ref 74–100)
HBA1C MFR BLD: 5.3 %
HCT VFR BLD AUTO: 40.4 % (ref 36.6–46.6)
HDLC SERPL-MCNC: 61 MG/DL (ref 40–59)
HGB BLD-MCNC: 13.97 G/DL (ref 12–15.4)
IS PATIENT FASTING: YES
LDLC SERPL DIRECT ASSAY-MCNC: 84 MG/DL (ref 0–100)
LYMPHOCYTES # BLD AUTO: 1.17 X10*3/UL (ref 0–6)
LYMPHOCYTES NFR BLD AUTO: 19.16 % (ref 20.5–51.1)
MCH RBC QN AUTO: 29.2 PG (ref 26–32)
MCHC RBC AUTO-ENTMCNC: 34.6 G/DL (ref 31–38)
MCV RBC AUTO: 84.4 FL (ref 80–96)
MONOCYTES # BLD AUTO: 0.62 X10*3/UL (ref 1.6–24.9)
MONOCYTES NFR BLD AUTO: 10.06 % (ref 1.7–9.3)
NEUTROPHILS # BLD AUTO: 4.29 X10*3/UL (ref 1.4–6.5)
NEUTROPHILS NFR BLD AUTO: 70.04 % (ref 42.2–75.2)
PLATELET # BLD AUTO: 239.9 X10*3/UL (ref 150–450)
PMV BLD AUTO: 7.62 FL (ref 7.8–11)
POTASSIUM SERPL-SCNC: 3.8 MMOL/L (ref 3.5–5.1)
RBC # BLD AUTO: 4.79 X10*6/UL (ref 3.9–5.3)
SODIUM SERPL-SCNC: 136 MMOL/L (ref 136–145)
TRIGL SERPL-MCNC: 110 MG/DL
WBC # BLD AUTO: 6.12 X10*3/UL (ref 4.5–10.5)

## 2025-08-21 PROCEDURE — G2211 COMPLEX E/M VISIT ADD ON: HCPCS | Performed by: INTERNAL MEDICINE

## 2025-08-21 PROCEDURE — 83036 HEMOGLOBIN GLYCOSYLATED A1C: CPT | Performed by: INTERNAL MEDICINE

## 2025-08-21 PROCEDURE — 99213 OFFICE O/P EST LOW 20 MIN: CPT | Performed by: INTERNAL MEDICINE

## 2025-08-21 PROCEDURE — 84460 ALANINE AMINO (ALT) (SGPT): CPT | Performed by: INTERNAL MEDICINE

## 2025-08-21 PROCEDURE — 1159F MED LIST DOCD IN RCRD: CPT | Performed by: INTERNAL MEDICINE

## 2025-08-21 PROCEDURE — 1160F RVW MEDS BY RX/DR IN RCRD: CPT | Performed by: INTERNAL MEDICINE

## 2025-08-21 PROCEDURE — 85025 COMPLETE CBC W/AUTO DIFF WBC: CPT | Performed by: INTERNAL MEDICINE

## 2025-08-21 RX ORDER — ACETAMINOPHEN 500 MG
500 TABLET ORAL EVERY 8 HOURS PRN
Qty: 100 TABLET | Refills: 6 | Status: SHIPPED | OUTPATIENT
Start: 2025-08-21

## 2025-08-21 RX ORDER — SERTRALINE HYDROCHLORIDE 25 MG/1
25 TABLET, FILM COATED ORAL
Qty: 30 TABLET | Refills: 3 | Status: SHIPPED | OUTPATIENT
Start: 2025-08-21 | End: 2025-09-20

## 2025-08-21 ASSESSMENT — ENCOUNTER SYMPTOMS
FATIGUE: 1
NERVOUS/ANXIOUS: 1
BACK PAIN: 1
SLEEP DISTURBANCE: 1
LOSS OF SENSATION IN FEET: 0
ARTHRALGIAS: 1
UNEXPECTED WEIGHT CHANGE: 0
DEPRESSION: 0
WOUND: 0
COLOR CHANGE: 1
ACTIVITY CHANGE: 1
OCCASIONAL FEELINGS OF UNSTEADINESS: 1

## 2025-11-21 ENCOUNTER — APPOINTMENT (OUTPATIENT)
Dept: PRIMARY CARE | Facility: CLINIC | Age: 85
End: 2025-11-21
Payer: MEDICARE

## 2026-05-19 ENCOUNTER — APPOINTMENT (OUTPATIENT)
Dept: PRIMARY CARE | Facility: CLINIC | Age: 86
End: 2026-05-19
Payer: MEDICARE